# Patient Record
Sex: FEMALE | Race: AMERICAN INDIAN OR ALASKA NATIVE | NOT HISPANIC OR LATINO | Employment: UNEMPLOYED | URBAN - NONMETROPOLITAN AREA
[De-identification: names, ages, dates, MRNs, and addresses within clinical notes are randomized per-mention and may not be internally consistent; named-entity substitution may affect disease eponyms.]

---

## 2017-01-17 ENCOUNTER — AMBULATORY - GICH (OUTPATIENT)
Dept: PHYSICAL THERAPY | Facility: OTHER | Age: 52
End: 2017-01-17

## 2017-01-17 DIAGNOSIS — M76.52 PATELLAR TENDINITIS OF LEFT KNEE: ICD-10-CM

## 2017-01-17 DIAGNOSIS — M17.0 PRIMARY OSTEOARTHRITIS OF BOTH KNEES: ICD-10-CM

## 2017-01-17 DIAGNOSIS — M76.51 PATELLAR TENDINITIS OF RIGHT KNEE: ICD-10-CM

## 2017-01-17 DIAGNOSIS — M23.209: ICD-10-CM

## 2017-01-20 ENCOUNTER — HOSPITAL ENCOUNTER (OUTPATIENT)
Dept: PHYSICAL THERAPY | Facility: OTHER | Age: 52
Setting detail: THERAPIES SERIES
End: 2017-01-20

## 2017-01-20 DIAGNOSIS — M23.209: ICD-10-CM

## 2017-01-20 DIAGNOSIS — M76.52 PATELLAR TENDINITIS OF LEFT KNEE: ICD-10-CM

## 2017-01-20 DIAGNOSIS — M76.51 PATELLAR TENDINITIS OF RIGHT KNEE: ICD-10-CM

## 2017-01-20 DIAGNOSIS — M17.0 PRIMARY OSTEOARTHRITIS OF BOTH KNEES: ICD-10-CM

## 2017-01-26 ENCOUNTER — HOSPITAL ENCOUNTER (OUTPATIENT)
Dept: PHYSICAL THERAPY | Facility: OTHER | Age: 52
Setting detail: THERAPIES SERIES
End: 2017-01-26

## 2017-02-02 ENCOUNTER — HOSPITAL ENCOUNTER (OUTPATIENT)
Dept: PHYSICAL THERAPY | Facility: OTHER | Age: 52
Setting detail: THERAPIES SERIES
End: 2017-02-02

## 2017-02-09 ENCOUNTER — HOSPITAL ENCOUNTER (OUTPATIENT)
Dept: PHYSICAL THERAPY | Facility: OTHER | Age: 52
Setting detail: THERAPIES SERIES
End: 2017-02-09

## 2017-02-14 ENCOUNTER — HOSPITAL ENCOUNTER (OUTPATIENT)
Dept: PHYSICAL THERAPY | Facility: OTHER | Age: 52
Setting detail: THERAPIES SERIES
End: 2017-02-14

## 2017-02-28 ENCOUNTER — HOSPITAL ENCOUNTER (OUTPATIENT)
Dept: PHYSICAL THERAPY | Facility: OTHER | Age: 52
Setting detail: THERAPIES SERIES
End: 2017-02-28

## 2017-03-02 ENCOUNTER — HOSPITAL ENCOUNTER (OUTPATIENT)
Dept: PHYSICAL THERAPY | Facility: OTHER | Age: 52
Setting detail: THERAPIES SERIES
End: 2017-03-02

## 2017-03-14 ENCOUNTER — HOSPITAL ENCOUNTER (OUTPATIENT)
Dept: PHYSICAL THERAPY | Facility: OTHER | Age: 52
Setting detail: THERAPIES SERIES
End: 2017-03-14

## 2017-09-01 ENCOUNTER — AMBULATORY - GICH (OUTPATIENT)
Dept: RADIOLOGY | Facility: OTHER | Age: 52
End: 2017-09-01

## 2017-09-01 DIAGNOSIS — M19.90 OSTEOARTHRITIS: ICD-10-CM

## 2017-09-07 ENCOUNTER — HOSPITAL ENCOUNTER (OUTPATIENT)
Dept: RADIOLOGY | Facility: OTHER | Age: 52
End: 2017-09-07

## 2017-09-07 DIAGNOSIS — M19.90 OSTEOARTHRITIS: ICD-10-CM

## 2017-09-11 ENCOUNTER — AMBULATORY - GICH (OUTPATIENT)
Dept: PHYSICAL THERAPY | Facility: OTHER | Age: 52
End: 2017-09-11

## 2017-09-11 DIAGNOSIS — M17.0 PRIMARY OSTEOARTHRITIS OF BOTH KNEES: ICD-10-CM

## 2017-09-12 ENCOUNTER — AMBULATORY - GICH (OUTPATIENT)
Dept: PHYSICAL THERAPY | Facility: OTHER | Age: 52
End: 2017-09-12

## 2017-09-12 DIAGNOSIS — M25.562 PAIN IN LEFT KNEE: ICD-10-CM

## 2017-09-12 DIAGNOSIS — M25.561 PAIN IN RIGHT KNEE: ICD-10-CM

## 2017-09-27 ENCOUNTER — HOSPITAL ENCOUNTER (OUTPATIENT)
Dept: PHYSICAL THERAPY | Facility: OTHER | Age: 52
Setting detail: THERAPIES SERIES
End: 2017-09-27

## 2017-09-27 DIAGNOSIS — M25.561 PAIN IN RIGHT KNEE: ICD-10-CM

## 2017-09-27 DIAGNOSIS — M25.562 PAIN IN LEFT KNEE: ICD-10-CM

## 2017-10-12 ENCOUNTER — HOSPITAL ENCOUNTER (OUTPATIENT)
Dept: PHYSICAL THERAPY | Facility: OTHER | Age: 52
Setting detail: THERAPIES SERIES
End: 2017-10-12

## 2017-10-19 ENCOUNTER — HOSPITAL ENCOUNTER (OUTPATIENT)
Dept: PHYSICAL THERAPY | Facility: OTHER | Age: 52
Setting detail: THERAPIES SERIES
End: 2017-10-19

## 2017-12-27 NOTE — PROGRESS NOTES
Patient Information     Patient Name MRN Danii Adams [id number] Female 1965      Progress Notes by Ruth Cano PT at 10/9/2017  2:36 PM     Author:  Ruth Cano, PT Service:  (none) Author Type:  PT- Physical Therapist     Filed:  10/9/2017  2:37 PM Date of Service:  10/9/2017  2:36 PM Status:  Signed     :  Ruth Cano PT (PT- Physical Therapist)            [facility]  Outpatient PT - Cancellation Note    Date:  10-9-17      Patient cancelled 10-9-17 visit due to migraine.  Had no-showed 10-4-17 visit.      Next visit scheduled:  10-12-17      Ruth Cano, PT ....................  10/9/2017   2:37 PM

## 2017-12-28 NOTE — PROGRESS NOTES
Patient Information     Patient Name MRN Sex Danii Patel [id number] Female 1965      Progress Notes by Ruth Cano PT at 10/19/2017  2:41 PM     Author:  Ruth Cano PT Service:  (none) Author Type:  PT- Physical Therapist     Filed:  10/19/2017  2:45 PM Date of Service:  10/19/2017  2:41 PM Status:  Signed     :  Ruth Cano PT (PT- Physical Therapist)            [facility]  Outpatient PT - Daily Note    Date of Service: 10/19/2017   Visit #3  Insurance Auth: 20    PSFS Visit:  3/10  PSFS Completed:  17    Patient Name: Danii Henderson   YOB: 1965   Referring MD/Provider: Dr. Strauss  Medical and Treatment Diagnosis: Bilateral knee pain [M25.561, M25.562]   PT Treatment Diagnosis: decreased mobility  Insurance: [facility]  Start of Service: 17  Certification Dates: Start of Service: 17  Medicare/MA Re-Cert Due: 17             Subjective      Rates pain: 7/10 in low back and both knees, right greater than left.  Ongoing over the past 3 days.  Using cream on back and knees seems to help.  No aggravating activity or injury that she can think of.     is scheduled to get stem cells injected into right.  botox injections are supposed to be the week of  or the week after.  Still waiting for pre-authorization.        Objective    Today's Intervention:      Observation:  Wearing hinged knee brace and using 4WW.    All BLE:  - quad sets, 5 sec holds, 10 x 2  - heel digs, 5 sec holds, 10 x 2  - SLR, 10 x 2    - sidelying ABD, 10 x 2  - prone ext, 10 x 2  - sidelyng ADD, 10 x 2    - Standing heel raises, 15 x 2  - gastroc stretch, 20 sec x 2      Next visit:  Progression of strengthening       Home Exercise Program:    None today.          Assessment    Therapist Assessment / Clinical Impression:  Rated pain 7/10 after exercise, and no indication of increased discomfort while doing  exercises today.        Goals:  Patient goal:  Decrease pain in 8 weeks.    Functional Short Term Goals (4 weeks):   - Patient will demonstrate correct performance of flexibility exercises for decreased knee pain.      Functional Long Term Goals (8 weeks):   - Patient will demonstrate 4/5 or better LE strength for increased stability with standing and walking.  - Patient will be able to walk up/down 4 steps with no pain/limitation.      Completed 9-27-17:  Patient Specific Functional and Pain Scales (PSFS)  Clinician Instructions: Complete after the history and before the exam.   Initial Assessment:   We want to know what 3 activities in your life you are unable to perform, or are having the most difficulty performing, as a result of your chief problem. Please list and score at least 3 activities that you are unable to perform, or having the most difficulty performing, because of your chief problem.   Patient Specific Activity Scoring Scheme (score one number for each activity):   Activity Score (0-10)  0= Unable to perform activity  10= Able to perform activity at same level as before injury or problem   1. walking  3/10   2. stairs 5/10   3. sleeping 3/10   Totals:  3.6/10   Patient verbally states that they understand that the information they have provided above is current and complete to the best of their knowledge.   Patient Specific Functional Scale Modifier Scale Conversion: (patient's modifier that correlates with pt's score on PSFS): 3-CL (70% Impaired).  Initial Primary G Code and Modifier:    Per the Patient's intake and/or assessment the Primary G Code is: Mobility .   The Patient's Impairment, Limitation or Restriction Modifier would be best described as: CL - 60% - 80% Impairment.   Goal Primary G Code and Modifier:    The Patient's G Code Goal would be: Mobility    The Patient's Impairment, Limitation or Restriction Modifier goal would be best described as: CJ - 20% - 40% Impairment.        Response to Intervention:  Good tolerance to treatment         Plan    Treatment Plan:      Frequency:   16 visits    Duration of Treatment: 8 weeks    Planned Interventions:    Home Exercise Program development  Therapeutic Exercise (ROM & Strengthening)  Therapeutic Activities  Neuromuscular Re-education  Manual Therapy  Ultrasound  E-Stim  Gait Training  Hot Packs / Cold Pack Modalities  Vasopneumatic Compression with Cold Therapy ( Game Ready )  Phonophoresis with Ketoprofen  Iontophoresis with Dexamethasone  Mechanical Traction    Plan for next visit:  See above

## 2017-12-28 NOTE — PROGRESS NOTES
Patient Information     Patient Name MRN Sex Danii Patel [id number] Female 1965      Progress Notes by Ruth Cano PT at 10/12/2017  3:33 PM     Author:  Ruth Cano PT Service:  (none) Author Type:  PT- Physical Therapist     Filed:  10/12/2017  4:10 PM Date of Service:  10/12/2017  3:33 PM Status:  Signed     :  Ruth Cano PT (PT- Physical Therapist)            [facility]  Outpatient PT - Daily Note    Date of Service: 10/12/2017   Visit #2  Insurance Auth: 20    PSFS Visit:  2/10  PSFS Completed:  17    Patient Name: Danii Henderson   YOB: 1965   Referring MD/Provider: Dr. Strauss  Medical and Treatment Diagnosis: Bilateral knee pain [M25.561, M25.562]   PT Treatment Diagnosis: decreased mobility  Insurance: [facility]  Start of Service: 17  Certification Dates: Start of Service: 17  Medicare/MA Re-Cert Due: 17             Subjective      Patient has not been seen since 17 (2 weeks).  Patient states she has been struggling with ongoing migraine headaches.    Rates pain: 4/10.  Did ice pack and applied cream to knee prior to therapy today.     is scheduled to get stem cells injected into right.  botox injections are supposed to be the week of  or the week after.  Still waiting for pre-authorization.        Objective    Today's Intervention:      Observation:  Wearing hinged knee brace and using 4WW.    - Nustep: seat 8, arms 9, workload 4 for 5 minutes.     - quad sets, 5 sec holds, 10 x 1  - heel digs, 5 sec holds, 10 x 1  - SLR, 10 x 1  - heel slides, 10 x 1  - SAQ, 2# 10 x 2    - sidelying ABD, 10 x 2  - prone ext, 10 x 2  - sidelyng ADD, 10 x 2    MedX:  Leg Press (seat 17, F): 60# 15 x 2 RLE    - Patient education for positioning and securing hinged knee brace.      Next visit:  Progression of strengthening       Home Exercise Program:    None  today.          Assessment    Therapist Assessment / Clinical Impression:  Did well with strengthening exercises.  No complaint of discomfort.        Goals:  Patient goal:  Decrease pain in 8 weeks.    Functional Short Term Goals (4 weeks):   - Patient will demonstrate correct performance of flexibility exercises for decreased knee pain.      Functional Long Term Goals (8 weeks):   - Patient will demonstrate 4/5 or better LE strength for increased stability with standing and walking.  - Patient will be able to walk up/down 4 steps with no pain/limitation.      Completed 9-27-17:  Patient Specific Functional and Pain Scales (PSFS)  Clinician Instructions: Complete after the history and before the exam.   Initial Assessment:   We want to know what 3 activities in your life you are unable to perform, or are having the most difficulty performing, as a result of your chief problem. Please list and score at least 3 activities that you are unable to perform, or having the most difficulty performing, because of your chief problem.   Patient Specific Activity Scoring Scheme (score one number for each activity):   Activity Score (0-10)  0= Unable to perform activity  10= Able to perform activity at same level as before injury or problem   1. walking  3/10   2. stairs 5/10   3. sleeping 3/10   Totals:  3.6/10   Patient verbally states that they understand that the information they have provided above is current and complete to the best of their knowledge.   Patient Specific Functional Scale Modifier Scale Conversion: (patient's modifier that correlates with pt's score on PSFS): 3-CL (70% Impaired).  Initial Primary G Code and Modifier:    Per the Patient's intake and/or assessment the Primary G Code is: Mobility .   The Patient's Impairment, Limitation or Restriction Modifier would be best described as: CL - 60% - 80% Impairment.   Goal Primary G Code and Modifier:    The Patient's G Code Goal would be: Mobility     The Patient's Impairment, Limitation or Restriction Modifier goal would be best described as: CJ - 20% - 40% Impairment.       Response to Intervention:  Good tolerance to treatment         Plan    Treatment Plan:      Frequency:   16 visits    Duration of Treatment: 8 weeks    Planned Interventions:    Home Exercise Program development  Therapeutic Exercise (ROM & Strengthening)  Therapeutic Activities  Neuromuscular Re-education  Manual Therapy  Ultrasound  E-Stim  Gait Training  Hot Packs / Cold Pack Modalities  Vasopneumatic Compression with Cold Therapy ( Game Ready )  Phonophoresis with Ketoprofen  Iontophoresis with Dexamethasone  Mechanical Traction    Plan for next visit:  See above

## 2017-12-28 NOTE — PROGRESS NOTES
Patient Information     Patient Name MRN Danii Adams [id number] Female 1965      Progress Notes by Naomi Bueno at 2017 11:44 AM     Author:  Naomi Bueno Service:  (none) Author Type:  Other Clinical Staff     Filed:  2017 11:44 AM Date of Service:  2017 11:44 AM Status:  Signed     :  Naomi Bueno (Other Clinical Staff)            Falls Risk Criteria:    Age 65 and older or under age 4        Sensory deficits    Poor vision    Use of ambulatory aides    Impaired judgment    Unable to walk independently    Meets High Risk criteria for falls:  no

## 2017-12-28 NOTE — ADDENDUM NOTE
Patient Information     Patient Name MRN Danii Adams [id number] Female 1965      Addendum Note by Nino Leal at 2017  7:52 AM     Author:  Nino Lela Service:  (none) Author Type:  (none)     Filed:  2017  7:52 AM Encounter Date:  2017 Status:  Signed     :  Nino Leal       Addended by: NINO LEAL on: 2017 07:52 AM        Modules accepted: Orders

## 2017-12-30 NOTE — INITIAL ASSESSMENTS
Patient Information     Patient Name MRN Danii Adams [id number] Female 1965      Initial Assessments by Ruth Cano PT at 2017 10:25 AM     Author:  Ruth Cano PT  Service:  (none) Author Type:  PT- Physical Therapist     Filed:  2017  8:59 AM  Date of Service:  2017 10:25 AM Status:  Addendum     :  Ruth Cano PT (PT- Physical Therapist)        Related Notes: Original Note by Ruth Cano PT (PT- Physical Therapist) filed at 2017  8:57 AM            [facility]  Outpatient PT - Initial Evaluation  Lower Extremity Eval    Date of Service: 2017   Visit #1  Insurance Auth: 20    PSFS Visit:  1/10  PSFS Completed:  17    Patient Name: Danii Henderson   YOB: 1965   Referring MD/Provider: Dr. Strauss  Medical and Treatment Diagnosis: Bilateral knee pain [M25.561, M25.562]   PT Treatment Diagnosis: decreased mobility  Insurance: [facility]  Start of Service: 17  Certification Dates: Start of Service: 17  Medicare/MA Re-Cert Due: 17     Precautions:  None   Cognition:  Oriented to Person, Place, and Time.     Were cultural / age or other special adaptations needed? No      Patient is a vulnerable adult: No      Patient is aware of diagnosis: Yes      Risks and benefits explained: Yes        Subjective        Date of onset: Several years ago, seems to be getting worse.  MRI was done for both knees.  Patietn describes a cyst behind the right knee and osteoarthritis has gotten worse.  Had tried a knee brace in the past, but that didn't seem to be helping enough, so now is using a 4WW.  Feels this is working well.  Wakes with pain during the night.    Follow up with physician:  October  Scheduled to see interventional pain clinic appointment for .    Supposed to get stem cells in right knee and botox injection (for migraine HA) on , but this was  "cancelled due to insurance error.     Rates pain: 8-10/10  Describes pain: sharp   Locates pain: under knee cap \"inside\"  Aggravating: walking, stairs  Easing: uses penetex (anti-inflammatory cream), zenadine, oxycontin (30 mg 3x/d), oxycodone (15 mg 4x/d)          Completed 9-27-17:  Patient Specific Functional and Pain Scales (PSFS)  Clinician Instructions: Complete after the history and before the exam.   Initial Assessment:   We want to know what 3 activities in your life you are unable to perform, or are having the most difficulty performing, as a result of your chief problem. Please list and score at least 3 activities that you are unable to perform, or having the most difficulty performing, because of your chief problem.   Patient Specific Activity Scoring Scheme (score one number for each activity):   Activity Score (0-10)  0= Unable to perform activity  10= Able to perform activity at same level as before injury or problem   1. walking  3/10   2. stairs 5/10   3. sleeping 3/10   Totals:  3.6/10   Patient verbally states that they understand that the information they have provided above is current and complete to the best of their knowledge.   Patient Specific Functional Scale Modifier Scale Conversion: (patient's modifier that correlates with pt's score on PSFS): 3-CL (70% Impaired).  Initial Primary G Code and Modifier:    Per the Patient's intake and/or assessment the Primary G Code is: Mobility .   The Patient's Impairment, Limitation or Restriction Modifier would be best described as: CL - 60% - 80% Impairment.   Goal Primary G Code and Modifier:    The Patient's G Code Goal would be: Mobility    The Patient's Impairment, Limitation or Restriction Modifier goal would be best described as: CJ - 20% - 40% Impairment.       Prior Level:  Minimal to no difficulty completing functional activities.        Past Medical History:     Diagnosis  Date     Unspecified Migraine without Mention of " Intractable Migraine      Past Surgical History:      Procedure  Laterality Date     HX ANKLE FRACTURE TREATMENT      left       HX TUBAL LIGATION            Driving:  yes  Home Environment:  Patient lives in a house with 4 steps to enter and 0 flights up/down stairs.  Assistive Devices: 4WW    Occupation:  Not working    Previous Treatment:    Pain Meds / Anti-inflammatory Meds  - Yes   Physical Therapy - yes  Injections - for knees, over the past 7 years  Surgery - No  Pain Clinic -  [facility]    Diagnostics:  Reviewed (see chart)   Current Medications:  Reviewed (see chart)    Drug Allergies:  Reviewed (see chart)  ?   Latex Allergy:  No          Objective    Items left blank indicate that the test was inappropriate or not meaningful at the time of evaluation and therefore not performed.    Fall Risk Screening:  No risk factors identified      ROM / Strength:                 Norms Left  Right Strength Left  Right   Hip Flexion 120  103 90 Hip Flexion     Hip Extension 15   Hip Extension 3/5 3/5   Hip Abduction 50   Hip Abduction 4/5 3/5   Hip External Rot. 60 60 60 Hip External Rot.     Hip Internal Rot. 40 32 25 Hip Internal Rot.     Knee Extension 0 +3 0 Knee Extension 5/5 5/5   Knee Flexion 135 135 121 Knee Flexion 5/5 5/5   Dorsiflexion 20   Dorsiflexion 5/5 5/5   Plantarflexion    Plantarflexion         Knee Circumference:   L R   5 cm prox to sup pat pole 41 42.5   Joint line 35 35   10 cm distal to inf pat pole 37 36.5       Observation:    SLR: L = 51 R = 55  Limited quadriceps flexibility bilaterally    Palpation:  Decreased lateral patellar mobility right greater than left.  No pain along joint line or posterior knee in sitting.    Gait:  Ambulation with 4WW.  Forward flexed posture.  Equal step length bilaterally.  Functional pace.          Today's Intervention:    - Evaluation  - Patient education for results of evaluation, goals, and treatment plan.  Patient voices  understanding and agreement.  - Adjusted height of walker handles to improve standing posture while walking.    Home Exercise Program:    None today.          Assessment    Therapist Assessment / Clinical Impression:  Signs and symptoms consistent with above diagnosis of bilateral knee pain.  Patient demonstrates decreased quadriceps flexibility, decreased lateral patellar mobility, and general LE weakness.      Functional Impairment(s): See subjective on initial evaluation and Functional Assessment / Summary Report from PSFS.        Goals:  Patient goal:  Decrease pain in 8 weeks.    Functional Short Term Goals (4 weeks):   - Patient will demonstrate correct performance of flexibility exercises for decreased knee pain.      Functional Long Term Goals (8 weeks):   - Patient will demonstrate 4/5 or better LE strength for increased stability with standing and walking.  - Patient will be able to walk up/down 4 steps with no pain/limitation.        Patient participated in goal selection and understand(s) the plan of care: Yes   Patient Potential for Achieving Desired Outcome: Good       Response to Intervention:  Good tolerance to treatment         Plan    Treatment Plan:      Frequency:   16 visits    Duration of Treatment: 8 weeks    Planned Interventions:    Home Exercise Program development  Therapeutic Exercise (ROM & Strengthening)  Therapeutic Activities  Neuromuscular Re-education  Manual Therapy  Ultrasound  E-Stim  Gait Training  Hot Packs / Cold Pack Modalities  Vasopneumatic Compression with Cold Therapy ( Game Ready )  Phonophoresis with Ketoprofen  Iontophoresis with Dexamethasone  Mechanical Traction    Plan for next visit:  See above    Thank you for your referral to [facility].  Please call with any questions, concerns or comments.  [phone]    The signature, of the referring medical provider, on this document indicates certification of the above prescribed plan of care and is  medically necessary.            X____________________________________________________    Physician Signature                     Date  Time

## 2018-01-03 NOTE — PROGRESS NOTES
Patient Information     Patient Name MRN Danii Adams [id number] Female 1965      Progress Notes by Ruth Cano, PT at 3/14/2017  4:14 PM     Author:  Ruth Cano, PT Service:  (none) Author Type:  PT- Physical Therapist     Filed:  3/14/2017  4:15 PM Date of Service:  3/14/2017  4:14 PM Status:  Signed     :  Ruth Cano PT (PT- Physical Therapist)            [facility]  Outpatient PT Note    Date:  3-14-17    Patient did not show for appointment today.  Had cancelled last week due to a death in the family.    Next scheduled visit:  3-16-17, 3-21-17        Ruth Cano, PT ....................  3/14/2017   4:15 PM

## 2018-01-03 NOTE — PROGRESS NOTES
Patient Information     Patient Name MRN Sex Danii Patel [id number] Female 1965      Progress Notes by Ruth Cano, PT at 3/2/2017  1:02 PM     Author:  Ruth Cano, PT Service:  (none) Author Type:  PT- Physical Therapist     Filed:  3/2/2017  1:42 PM Date of Service:  3/2/2017  1:02 PM Status:  Signed     :  Ruth Cano PT (PT- Physical Therapist)            [facility]  Outpatient PT - Daily Note    Date of Service: 3/2/2017   Visit #7  Insurance Auth:     PSFS Visit:  7/10  PSFS Completed:  17    Patient Name: Danii Hendersno   YOB: 1965   Referring MD/Provider: Dr. Strauss  Medical and Treatment Diagnosis: Bilateral knee osteoarthritis, patellar tenonitis, meniscus tear  PT Treatment Diagnosis: decreased mobility  Insurance: [employer]  Start of Service: 17  Certification Dates: Start of Service: 17  Medicare/MA Re-Cert Due: 17           Subjective      Rates pain:  3/10 in low back and knees.      Follow up with physician:  2017      Objective    Today's Intervention:      - standing quadriceps stretches with foot on chair seat, 20 sec x 3 BLE    - quad sets, 5 sec holds, 5 x 1 BLE  - SLR 10 x  BLE  - Sidelying ABD, 1# 10 x 2 BLE  - bridge, 10 x 1  - bridge, increased WB R = 5 x 1; L = 10 x 1; 10 x 1     MedX:  Leg Press (seat 20):  100# 15 x 1; 110# 15 x 1  Hamstring curls (back 7): 70# 15 x 1; 8 x 1  Abduction (knees 1):  60# 15 x 2  Leg Extension (back 7): 40# 15 x 2    - standing mini-lunge with hand on rail, 10 x 1 BLE - cues for a longer step which improved technique significantly    Next Visit:  hip and knee strengthening      Home Exercise Program:    17:  Quad, hamstring, gastroc stretches  17:  Bridge, supine hip IR/ER  17:  Quad set, heel dig, SLR          Assessment    Therapist Assessment / Clinical Impression:  Continued fatigue with hamstring curls.  Able to  increase reps for all other machines.        Goals:  Patient goal:  Decreased knee pain with walking in 8 weeks.    Functional Short Term Goals (4 weeks):   - Patient will demonstrate correct performance of HEP with minimal to no cues required for increased BLE flexibility.      Functional Long Term Goals (8 weeks):   - Patient will report decreased pain from 8/10 to 0-4/10 with walking.  - Patient will demonstrate 4+/5 hip strength testing or better for improved stability with prolonged standing and walking.  - Patient will report minimal to no sleep disruption due to knee pain.        Completed 1-20-17:  Patient Specific Functional and Pain Scales (PSFS)  Clinician Instructions: Complete after the history and before the exam.   Initial Assessment:   We want to know what 3 activities in your life you are unable to perform, or are having the most difficulty performing, as a result of your chief problem. Please list and score at least 3 activities that you are unable to perform, or having the most difficulty performing, because of your chief problem.   Patient Specific Activity Scoring Scheme (score one number for each activity):   Activity Score (0-10)  0= Unable to perform activity  10= Able to perform activity at same level as before injury or problem   1. walking 4/10   2. sleeping 6/10   3. Household tasks 6/10   Totals:  5.3/10   Patient verbally states that they understand that the information they have provided above is current and complete to the best of their knowledge.   Patient Specific Functional Scale Modifier Scale Conversion: (patient's modifier that correlates with pt's score on PSFS): 5-CK (50% Impaired).  Initial Primary G Code and Modifier:    Per the Patient's intake and/or assessment the Primary G Code is: Mobility .   The Patient's Impairment, Limitation or Restriction Modifier would be best described as: CK - 40% - 60% Impairment.   Goal Primary G Code and Modifier:    The Patient's G  Code Goal would be: Mobility    The Patient's Impairment, Limitation or Restriction Modifier goal would be best described as: CI - 1% - 20% Impairment.            Response to Intervention:  Good tolerance to treatment         Plan    Treatment Plan:      Frequency:   16 visits    Duration of Treatment: 8 weeks    Planned Interventions:    Home Exercise Program development  Therapeutic Exercise (ROM & Strengthening)  Therapeutic Activities  Neuromuscular Re-education  Manual Therapy  Ultrasound  E-Stim  Gait Training  Hot Packs / Cold Pack Modalities  Vasopneumatic Compression with Cold Therapy ( Game Ready )  Phonophoresis with Ketoprofen  Iontophoresis with Dexamethasone  Mechanical Traction    Plan for next visit:  See above.

## 2018-01-03 NOTE — PROGRESS NOTES
Patient Information     Patient Name MRN Sex Danii Patel [id number] Female 1965      Progress Notes by Ruth Cano PT at 2017  1:43 PM     Author:  Ruth Cano PT Service:  (none) Author Type:  PT- Physical Therapist     Filed:  2017  4:39 PM Date of Service:  2017  1:43 PM Status:  Signed     :  Ruth Cano PT (PT- Physical Therapist)            [facility]  Outpatient PT - Daily Note    Date of Service: 2017   Visit #5  Insurance Auth:     PSFS Visit:  5/10  PSFS Completed:  17    Patient Name: Danii Henderson   YOB: 1965   Referring MD/Provider: Dr. Strauss  Medical and Treatment Diagnosis: Bilateral knee osteoarthritis, patellar tenonitis, meniscus tear  PT Treatment Diagnosis: decreased mobility  Insurance: [facility]  Start of Service: 17  Certification Dates: Start of Service: 17  Medicare/MA Re-Cert Due: 17           Subjective      Rates pain: 4-5/10.  Was getting some sharp pains on the way over to appointment today so used some of her cream.  Feels treatment is helping and would like to try stim on both knees.          Follow up with physician:  2017      Objective    Today's Intervention:      - Electrical stim with electrodes placed at VMO and lateral gastroc bilaterally.  After between 5-10 min, patient had moved the 2nd channel of electrodes back to the right knee in crossed position from VLO and medial proximal gastroc, 20 min plus set-up time.  Patient supine with large pillow under bilateral knees.  - Hot Packs to anterior knees and distal quads bilaterally, 20 min - simultaneous with E-stim    - prone quadriceps stretches, 20 sec x 4 BLE  - quad sets, 5 sec holds, 5 x 1  - SLR 5 x 1 BLE  - bridge, 10 x 1  - Sidelying ABD, 10 x 1 BLE    - Recommended continuation of heat with e-stim with home unit.  Plan to progress exercises as tolerated.      Next  Visit:  Modalities prn for pain control, hip strengthening (add ABD), add knee flexion if tolerated      Home Exercise Program:    1-20-17:  Quad, hamstring, gastroc stretches  1-26-17:  Bridge, supine hip IR/ER  2-2-17:  Quad set, heel dig, SLR          Assessment    Therapist Assessment / Clinical Impression:  Continued decreased pain with modalities.  Able to complete exercises today.        Goals:  Patient goal:  Decreased knee pain with walking in 8 weeks.    Functional Short Term Goals (4 weeks):   - Patient will demonstrate correct performance of HEP with minimal to no cues required for increased BLE flexibility.      Functional Long Term Goals (8 weeks):   - Patient will report decreased pain from 8/10 to 0-4/10 with walking.  - Patient will demonstrate 4+/5 hip strength testing or better for improved stability with prolonged standing and walking.  - Patient will report minimal to no sleep disruption due to knee pain.        Completed 1-20-17:  Patient Specific Functional and Pain Scales (PSFS)  Clinician Instructions: Complete after the history and before the exam.   Initial Assessment:   We want to know what 3 activities in your life you are unable to perform, or are having the most difficulty performing, as a result of your chief problem. Please list and score at least 3 activities that you are unable to perform, or having the most difficulty performing, because of your chief problem.   Patient Specific Activity Scoring Scheme (score one number for each activity):   Activity Score (0-10)  0= Unable to perform activity  10= Able to perform activity at same level as before injury or problem   1. walking 4/10   2. sleeping 6/10   3. Household tasks 6/10   Totals:  5.3/10   Patient verbally states that they understand that the information they have provided above is current and complete to the best of their knowledge.   Patient Specific Functional Scale Modifier Scale Conversion: (patient's modifier that  correlates with pt's score on PSFS): 5-CK (50% Impaired).  Initial Primary G Code and Modifier:    Per the Patient's intake and/or assessment the Primary G Code is: Mobility .   The Patient's Impairment, Limitation or Restriction Modifier would be best described as: CK - 40% - 60% Impairment.   Goal Primary G Code and Modifier:    The Patient's G Code Goal would be: Mobility    The Patient's Impairment, Limitation or Restriction Modifier goal would be best described as: CI - 1% - 20% Impairment.            Response to Intervention:  Good tolerance to treatment         Plan    Treatment Plan:      Frequency:   16 visits    Duration of Treatment: 8 weeks    Planned Interventions:    Home Exercise Program development  Therapeutic Exercise (ROM & Strengthening)  Therapeutic Activities  Neuromuscular Re-education  Manual Therapy  Ultrasound  E-Stim  Gait Training  Hot Packs / Cold Pack Modalities  Vasopneumatic Compression with Cold Therapy ( Game Ready )  Phonophoresis with Ketoprofen  Iontophoresis with Dexamethasone  Mechanical Traction    Plan for next visit:  See above.

## 2018-01-03 NOTE — PROGRESS NOTES
"Patient Information     Patient Name MRN Danii Adams [id number] Female 1965      Progress Notes by Ruth Cano PT at 2017 12:59 PM     Author:  Ruth Cano PT  Service:  (none) Author Type:  PT- Physical Therapist     Filed:  3/2/2017  1:45 PM  Date of Service:  2017 12:59 PM Status:  Addendum     :  Ruth Cano PT (PT- Physical Therapist)        Related Notes: Original Note by Ruth Cano PT (PT- Physical Therapist) filed at 2017  3:26 PM            [facility]  Outpatient PT - Daily Note    Date of Service: 2017   Visit #6  Insurance Auth:     PSFS Visit:  6/10  PSFS Completed:  17    Patient Name: Danii Henderson   YOB: 1965   Referring MD/Provider: Dr. Strauss  Medical and Treatment Diagnosis: Bilateral knee osteoarthritis, patellar tenonitis, meniscus tear  PT Treatment Diagnosis: decreased mobility  Insurance: [employer]  Start of Service: 17  Certification Dates: Start of Service: 17  Medicare/MA Re-Cert Due: 17           Subjective      Patient has not been seen since 17 (2 weeks).    Rates pain:  2/10 in back and knees.  Fell a couple of Saturdays ago.  Slipped on ice landing directly on both knees and torres her back.  States she was bed-ridden for about 4 days.  Did use ice for back and knees.  Was seen by a pain physician in the [facility] and was told she has some trigger points in her low back.  Still using penetrix pain cream.        Follow up with physician:  2017      Objective    Today's Intervention:      - standing quadriceps stretches with foot on chair seat, 20 sec x 3 LLE  - standing quadriceps stretches with foot on 8\", 12\" step, then took brace off and switched to chair seat, 20 sec x 3 RLE    - quad sets, 5 sec holds, 10 x 1 BLE  - SLR 10 x  BLE  - Sidelying ABD, 10 x 2 BLE  - bridge, 10 x 2    MedX:  Leg Press (seat 20):  100# " 10 x 2  Hamstring curls (back 7): 70# 10 x 2  Abduction (knees 1):  60# 10 x 2  Leg Extension (back 7): 40# 10 x 2    - standing mini-lunge with hand on rail, 10 x 2 BLE - demonstration and cues required    Next Visit:  Modalities prn for pain control, hip strengthening (add ABD), add knee flexion if tolerated      Home Exercise Program:    1-20-17:  Quad, hamstring, gastroc stretches  1-26-17:  Bridge, supine hip IR/ER  2-2-17:  Quad set, heel dig, SLR          Assessment    Therapist Assessment / Clinical Impression:  Patient was able to complete all exercises.  Hamstring curl was the only machine that patient reported some knee pain.  Suggested patient stop if her pain was increasing, but patient wished to complete reps/sets.        Goals:  Patient goal:  Decreased knee pain with walking in 8 weeks.    Functional Short Term Goals (4 weeks):   - Patient will demonstrate correct performance of HEP with minimal to no cues required for increased BLE flexibility.      Functional Long Term Goals (8 weeks):   - Patient will report decreased pain from 8/10 to 0-4/10 with walking.  - Patient will demonstrate 4+/5 hip strength testing or better for improved stability with prolonged standing and walking.  - Patient will report minimal to no sleep disruption due to knee pain.        Completed 1-20-17:  Patient Specific Functional and Pain Scales (PSFS)  Clinician Instructions: Complete after the history and before the exam.   Initial Assessment:   We want to know what 3 activities in your life you are unable to perform, or are having the most difficulty performing, as a result of your chief problem. Please list and score at least 3 activities that you are unable to perform, or having the most difficulty performing, because of your chief problem.   Patient Specific Activity Scoring Scheme (score one number for each activity):   Activity Score (0-10)  0= Unable to perform activity  10= Able to perform activity at same level as  before injury or problem   1. walking 4/10   2. sleeping 6/10   3. Household tasks 6/10   Totals:  5.3/10   Patient verbally states that they understand that the information they have provided above is current and complete to the best of their knowledge.   Patient Specific Functional Scale Modifier Scale Conversion: (patient's modifier that correlates with pt's score on PSFS): 5-CK (50% Impaired).  Initial Primary G Code and Modifier:    Per the Patient's intake and/or assessment the Primary G Code is: Mobility .   The Patient's Impairment, Limitation or Restriction Modifier would be best described as: CK - 40% - 60% Impairment.   Goal Primary G Code and Modifier:    The Patient's G Code Goal would be: Mobility    The Patient's Impairment, Limitation or Restriction Modifier goal would be best described as: CI - 1% - 20% Impairment.            Response to Intervention:  Good tolerance to treatment         Plan    Treatment Plan:      Frequency:   16 visits    Duration of Treatment: 8 weeks    Planned Interventions:    Home Exercise Program development  Therapeutic Exercise (ROM & Strengthening)  Therapeutic Activities  Neuromuscular Re-education  Manual Therapy  Ultrasound  E-Stim  Gait Training  Hot Packs / Cold Pack Modalities  Vasopneumatic Compression with Cold Therapy ( Game Ready )  Phonophoresis with Ketoprofen  Iontophoresis with Dexamethasone  Mechanical Traction    Plan for next visit:  See above.

## 2018-01-03 NOTE — PROGRESS NOTES
Patient Information     Patient Name MRN Danii Adams [id number] Female 1965      Progress Notes by Ruth Cano, PT at 2017  9:37 AM     Author:  Ruth Cano, PT Service:  (none) Author Type:  PT- Physical Therapist     Filed:  2017  9:37 AM Date of Service:  2017  9:37 AM Status:  Signed     :  Ruth Cano PT (PT- Physical Therapist)            [facility]  Outpatient PT - Cancellation Note    Date:  17      Patient cancelled 17 visit due to illness.      Next visit scheduled:  17      Ruth Cano PT ....................  2017   9:37 AM

## 2018-01-03 NOTE — PROGRESS NOTES
"Patient Information     Patient Name MRN Sex Danii Patel [id number] Female 1965      Progress Notes by Ruth Cano, PT at 2017  1:07 PM     Author:  Ruth Cano, PT Service:  (none) Author Type:  PT- Physical Therapist     Filed:  2/3/2017 10:15 AM Date of Service:  2017  1:07 PM Status:  Signed     :  Ruth Cano PT (PT- Physical Therapist)            [facility]  Outpatient PT - Daily Note    Date of Service: 2017   Visit #3  Insurance Auth: 20    PSFS Visit:  3/10  PSFS Completed:  17    Patient Name: Danii Henderson   YOB: 1965   Referring MD/Provider: Dr. Strauss  Medical and Treatment Diagnosis: Bilateral knee osteoarthritis, patellar tenonitis, meniscus tear  PT Treatment Diagnosis: decreased mobility  Insurance: [employer]  Start of Service: 17  Certification Dates: Start of Service: 17  Medicare/MA Re-Cert Due: 17           Subjective      Rates pain: 9-10/10 prior to pain pill today, currently 6/10.  Has a migraine today.  Could barely walk for a couple days after doing Ultrasound at last session.  \"Pain cream\" didn't help pain go away.       Follow up with physician:  2017      Objective      ROM / Strength:                 Norms Left  Right Strength Left  Right     52 56 Hip Flexion     Hip Extension 15   Hip Extension 4/5 3/5   Hip Abduction 50   Hip Abduction 4/5 4-/5   Hip External Rot. 60 58 60 Hip External Rot. 3+/5 3+/5   Hip Internal Rot. 40 22 22 Hip Internal Rot.     Knee Extension 0 +3 +3 Knee Extension 5/5 5/5 pain   Knee Flexion 135 130 127 Knee Flexion 4+/5 4+/5   Dorsiflexion 20   Dorsiflexion 5/5 5/5   Plantarflexion    Plantarflexion 5-/5 4+/5     Today's Intervention:      - Electrical stim with crossed electrodes placed at VMO/lateral gastroc and VLO/medial gastroc, 11 min.  Patient supine with large pillow under bilateral knees.  - Hot Packs to " posterior and anterior knees bilaterally (using 2 cervical packs), 11 min - simultaneous with E-stim    - prone quadriceps stretches, 20 sec x 3 BLE  - quad sets, 5 sec 10 x 1 BLE  - heel digs, 5 sec 10 x 1 R/L LE  - SLR, 5 x 1 R/L LE  * Added to HEP.  Patient was given a handout with picture and written instructions for exercise including guidelines for repetitions and frequency of performance.  Patient voiced understanding.      Next Visit:  Modalities prn for pain control, hip strengthening (add ABD), add knee flexion if tolerated    Home Exercise Program:    1-20-17:  Quad, hamstring, gastroc stretches  1-26-17:  Bridge, supine hip IR/ER  2-2-17:  Quad set, heel dig, SLR          Assessment    Therapist Assessment / Clinical Impression:  Exacerbation of pain after trial of US.  Change in modalities to Estim/HP for pain control.  Patient stated the estim and heat felt really good.  Progression of knee exercises.        Goals:  Patient goal:  Decreased knee pain with walking in 8 weeks.    Functional Short Term Goals (4 weeks):   - Patient will demonstrate correct performance of HEP with minimal to no cues required for increased BLE flexibility.      Functional Long Term Goals (8 weeks):   - Patient will report decreased pain from 8/10 to 0-4/10 with walking.  - Patient will demonstrate 4+/5 hip strength testing or better for improved stability with prolonged standing and walking.  - Patient will report minimal to no sleep disruption due to knee pain.        Completed 1-20-17:  Patient Specific Functional and Pain Scales (PSFS)  Clinician Instructions: Complete after the history and before the exam.   Initial Assessment:   We want to know what 3 activities in your life you are unable to perform, or are having the most difficulty performing, as a result of your chief problem. Please list and score at least 3 activities that you are unable to perform, or having the most difficulty performing, because of your chief  problem.   Patient Specific Activity Scoring Scheme (score one number for each activity):   Activity Score (0-10)  0= Unable to perform activity  10= Able to perform activity at same level as before injury or problem   1. walking 4/10   2. sleeping 6/10   3. Household tasks 6/10   Totals:  5.3/10   Patient verbally states that they understand that the information they have provided above is current and complete to the best of their knowledge.   Patient Specific Functional Scale Modifier Scale Conversion: (patient's modifier that correlates with pt's score on PSFS): 5-CK (50% Impaired).  Initial Primary G Code and Modifier:    Per the Patient's intake and/or assessment the Primary G Code is: Mobility .   The Patient's Impairment, Limitation or Restriction Modifier would be best described as: CK - 40% - 60% Impairment.   Goal Primary G Code and Modifier:    The Patient's G Code Goal would be: Mobility    The Patient's Impairment, Limitation or Restriction Modifier goal would be best described as: CI - 1% - 20% Impairment.            Response to Intervention:  Good tolerance to treatment         Plan    Treatment Plan:      Frequency:   16 visits    Duration of Treatment: 8 weeks    Planned Interventions:    Home Exercise Program development  Therapeutic Exercise (ROM & Strengthening)  Therapeutic Activities  Neuromuscular Re-education  Manual Therapy  Ultrasound  E-Stim  Gait Training  Hot Packs / Cold Pack Modalities  Vasopneumatic Compression with Cold Therapy ( Game Ready )  Phonophoresis with Ketoprofen  Iontophoresis with Dexamethasone  Mechanical Traction    Plan for next visit:  See above.

## 2018-01-03 NOTE — PROGRESS NOTES
Patient Information     Patient Name MRN Sex Danii Patel [id number] Female 1965      Progress Notes by Ruth Cano PT at 2017  1:07 PM     Author:  Ruth Cano PT Service:  (none) Author Type:  PT- Physical Therapist     Filed:  2017  1:51 PM Date of Service:  2017  1:07 PM Status:  Signed     :  Ruth Cano PT (PT- Physical Therapist)            [facility]  Outpatient PT - Daily Note    Date of Service: 2017   Visit #4  Insurance Auth:     PSFS Visit:  4/10  PSFS Completed:  17    Patient Name: Danii Henderson   YOB: 1965   Referring MD/Provider: Dr. Strauss  Medical and Treatment Diagnosis: Bilateral knee osteoarthritis, patellar tenonitis, meniscus tear  PT Treatment Diagnosis: decreased mobility  Insurance: [facility]  Start of Service: 17  Certification Dates: Start of Service: 17  Medicare/MA Re-Cert Due: 17           Subjective      Rates pain: 3/10.  Got a new knee brace for right knee (about 1 week ago).  Seems to be helping.  Felt the e-stim and heat really helped as well, with improvement for a couple of days.  Continues to struggle with migraine headaches.  Has a headache today.  Patient reports compliance with stretching every other day.       Follow up with physician:  2017      Objective    Today's Intervention:      - Electrical stim with crossed electrodes placed at VMO/lateral gastroc and VLO/medial gastroc, 20 min plus set-up time.  Patient supine with large pillow under bilateral knees.  - Hot Packs to posterior and anterior knees bilaterally (using 2 cervical packs), 20 min - simultaneous with E-stim    - prone quadriceps stretches, 20 sec x 4 BLE    - Brief review of HEP.  No further exercises completed due to time limitation and patient has a migraine.  - Patient able to don/doff brace independently.     Next Visit:  Modalities prn for pain control, hip  strengthening (add ABD), add knee flexion if tolerated    Home Exercise Program:    1-20-17:  Quad, hamstring, gastroc stretches  1-26-17:  Bridge, supine hip IR/ER  2-2-17:  Quad set, heel dig, SLR          Assessment    Therapist Assessment / Clinical Impression:  Decreased pain with e-stim, heat, and use of new brace for right knee.        Goals:  Patient goal:  Decreased knee pain with walking in 8 weeks.    Functional Short Term Goals (4 weeks):   - Patient will demonstrate correct performance of HEP with minimal to no cues required for increased BLE flexibility.      Functional Long Term Goals (8 weeks):   - Patient will report decreased pain from 8/10 to 0-4/10 with walking.  - Patient will demonstrate 4+/5 hip strength testing or better for improved stability with prolonged standing and walking.  - Patient will report minimal to no sleep disruption due to knee pain.        Completed 1-20-17:  Patient Specific Functional and Pain Scales (PSFS)  Clinician Instructions: Complete after the history and before the exam.   Initial Assessment:   We want to know what 3 activities in your life you are unable to perform, or are having the most difficulty performing, as a result of your chief problem. Please list and score at least 3 activities that you are unable to perform, or having the most difficulty performing, because of your chief problem.   Patient Specific Activity Scoring Scheme (score one number for each activity):   Activity Score (0-10)  0= Unable to perform activity  10= Able to perform activity at same level as before injury or problem   1. walking 4/10   2. sleeping 6/10   3. Household tasks 6/10   Totals:  5.3/10   Patient verbally states that they understand that the information they have provided above is current and complete to the best of their knowledge.   Patient Specific Functional Scale Modifier Scale Conversion: (patient's modifier that correlates with pt's score on PSFS): 5-CK (50%  Impaired).  Initial Primary G Code and Modifier:    Per the Patient's intake and/or assessment the Primary G Code is: Mobility .   The Patient's Impairment, Limitation or Restriction Modifier would be best described as: CK - 40% - 60% Impairment.   Goal Primary G Code and Modifier:    The Patient's G Code Goal would be: Mobility    The Patient's Impairment, Limitation or Restriction Modifier goal would be best described as: CI - 1% - 20% Impairment.            Response to Intervention:  Good tolerance to treatment         Plan    Treatment Plan:      Frequency:   16 visits    Duration of Treatment: 8 weeks    Planned Interventions:    Home Exercise Program development  Therapeutic Exercise (ROM & Strengthening)  Therapeutic Activities  Neuromuscular Re-education  Manual Therapy  Ultrasound  E-Stim  Gait Training  Hot Packs / Cold Pack Modalities  Vasopneumatic Compression with Cold Therapy ( Game Ready )  Phonophoresis with Ketoprofen  Iontophoresis with Dexamethasone  Mechanical Traction    Plan for next visit:  See above.

## 2018-01-03 NOTE — DISCHARGE SUMMARY
Patient Information     Patient Name MRN Danii Adams [id number] Female 1965      Discharge Summaries by Ruth Cano PT at 3/22/2017  9:22 AM     Author:  Ruth Cano PT Service:  (none) Author Type:  PT- Physical Therapist     Filed:  3/22/2017  9:25 AM Date of Service:  3/22/2017  9:22 AM Status:  Signed     :  Ruth Cano PT (PT- Physical Therapist)              [facility]  Outpatient PT - Discharge Summary    Date:  3-22-17    Dates of Service: 17    Thru:  3-2-17  Number of visits: 7   Physician:  Dr. Strauss  Diagnosis:  Bilateral knee OA, patellar tendonitis, meniscus tear            Reason for Discharge:  Patient cancelled all visits stating her physician told her she no longer needs therapy.      Progress from Initial to Discharge(if applicable):  Patient was last seen on 3-2-17.  Please see that note for more information.  Patient cancelled a couple of visits due to a death in the family, and no-showed the next scheduled visit.  Cancelled yesterday due to therapist having to leave the office, and patient then let reception know she could be discharged from therapy.    Goals and PSFS were unable to be reassessed as discharge was unplanned.      Further Recommendations:      None      Patient is discharged from Physical Therapy

## 2018-01-03 NOTE — PROGRESS NOTES
Patient Information     Patient Name MRN Danii Adams [id number] Female 1965      Progress Notes by Ruth Cano PT at 2017 10:56 AM     Author:  Ruth Cano, PT Service:  (none) Author Type:  PT- Physical Therapist     Filed:  2017 10:56 AM Date of Service:  2017 10:56 AM Status:  Signed     :  Ruth Cano PT (PT- Physical Therapist)            [facility]  Outpatient PT - Cancellation Note    Date:  17      Patient cancelled 17 visit due to illness.      Next visit scheduled:  17      Ruth Cano PT ....................  2017   10:56 AM

## 2018-01-03 NOTE — PROGRESS NOTES
Patient Information     Patient Name MRN Danii Adams [id number] Female 1965      Progress Notes by Ruth Cano, PT at 2017  2:23 PM     Author:  Ruth Cano, PT Service:  (none) Author Type:  PT- Physical Therapist     Filed:  2017  2:24 PM Date of Service:  2017  2:23 PM Status:  Signed     :  Ruth Cano PT (PT- Physical Therapist)            [facility]  Outpatient PT - Cancellation Note    Date:  17      Patient cancelled 17 visit due to recent fall and cannot find someone to give her a ride to therapy.      Next visit scheduled:  17      Ruth Cano, PT ....................  2017   2:24 PM

## 2018-01-03 NOTE — INITIAL ASSESSMENTS
"Patient Information     Patient Name MRN Sex Danii Patel [id number] Female 1965      Initial Assessments by Ruth Cano PT at 2017  3:10 PM     Author:  Ruth Cano PT Service:  (none) Author Type:  PT- Physical Therapist     Filed:  2017  4:09 PM Date of Service:  2017  3:10 PM Status:  Signed     :  Ruth Cano PT (PT- Physical Therapist)            [facility]  Outpatient PT - Initial Evaluation  Lower Extremity Eval    Date of Service: 2017   Visit #1  Insurance Auth: 20    PSFS Visit:  1/10  PSFS Completed:  17    Patient Name: Danii Henderson   YOB: 1965   Referring MD/Provider: Dr. Strauss  Medical and Treatment Diagnosis: Bilateral knee osteoarthritis, patellar tenonitis, meniscus tear  PT Treatment Diagnosis: decreased mobility  Insurance: [facility]  Start of Service: 17  Certification Dates: Start of Service: 17  Medicare/MA Re-Cert Due: 17     Precautions:  None   Cognition:  Oriented to Person, Place, and Time.     Were cultural / age or other special adaptations needed? No      Patient is a vulnerable adult: No      Patient is aware of diagnosis: Yes      Risks and benefits explained: Yes        Subjective        Date of onset: At least a couple of years ago.  Recently has gotten worse, over the past 6 months or so.  Can barely walk in the morning.  Wakes from sleep due to pain.  Has been using \"Penetrex\" (OTC cream).  Started using insoles for shoes last week ([employer] brand).  Was fit for a knee brace yesterday for the right knee.  Not sure how long before she gets the brace.  Tried water therapy at [employer], [facility].  States this seemed to help.    Follow up with physician:  2017    Rates pain: 8/10 average, right knee worse than left  Describes pain: throbbing, piercing, sharp  Locates pain: whole knee, cannot point to one spot - uses cream on " front and back  Aggravating: walking (worse after 15-20 min), sleep (wakes 2x/night minimum)  Easing: sitting, oxycodone (4x/day) and oxycontin (3x/day), OTC cream, ice (daily), active knee flex/ext in sitting      Completed 1-20-17:  Patient Specific Functional and Pain Scales (PSFS)  Clinician Instructions: Complete after the history and before the exam.   Initial Assessment:   We want to know what 3 activities in your life you are unable to perform, or are having the most difficulty performing, as a result of your chief problem. Please list and score at least 3 activities that you are unable to perform, or having the most difficulty performing, because of your chief problem.   Patient Specific Activity Scoring Scheme (score one number for each activity):   Activity Score (0-10)  0= Unable to perform activity  10= Able to perform activity at same level as before injury or problem   1. walking 4/10   2. sleeping 6/10   3. Household tasks 6/10   Totals:  5.3/10   Patient verbally states that they understand that the information they have provided above is current and complete to the best of their knowledge.   Patient Specific Functional Scale Modifier Scale Conversion: (patient's modifier that correlates with pt's score on PSFS): 5-CK (50% Impaired).  Initial Primary G Code and Modifier:    Per the Patient's intake and/or assessment the Primary G Code is: Mobility .   The Patient's Impairment, Limitation or Restriction Modifier would be best described as: CK - 40% - 60% Impairment.   Goal Primary G Code and Modifier:    The Patient's G Code Goal would be: Mobility    The Patient's Impairment, Limitation or Restriction Modifier goal would be best described as: CI - 1% - 20% Impairment.           Prior Level:  Minimal to no difficulty completing functional activities.        Past Medical History     Diagnosis  Date     Unspecified Migraine without Mention of Intractable Migraine      Past Surgical History        Procedure   Laterality Date     Tubal ligation        Ankle fracture treatment        left            Driving:  yes  Home Environment:  Patient lives in a trailer home with 4 steps to enter and 0 flights up/down stairs.  Assistive Devices: SEC, uses daily, usually in the morning when pain is worse.  Periodically in daytime outside.    Occupation:  NA    Previous Treatment:    Pain Meds / Anti-inflammatory Meds  - Yes - see above  Physical Therapy - pool therapy  Injections - Has had several injections bilaterally and [facility] interventional pain clinic.  No relief.  Surgery - No  Pain Clinic - Yes    Diagnostics:  Reviewed (see chart); Had 4 x-rays recently at [facility]  - Medial joint space narrowing and suprapatellar effusion noted bilaterally.   Current Medications:  Reviewed (see chart)    Drug Allergies:  Reviewed (see chart)  ?   Latex Allergy:  No           Objective    Items left blank indicate that the test was inappropriate or not meaningful at the time of evaluation and therefore not performed.    Fall Risk Screening:  No risk factors identified      ROM / Strength:                 Norms Left  Right Strength Left  Right     52 56 Hip Flexion     Hip Extension 15   Hip Extension 4/5 3/5   Hip Abduction 50   Hip Abduction 4/5 4-/5   Hip External Rot. 60 58 60 Hip External Rot. 3+/5 3+/5   Hip Internal Rot. 40 22 22 Hip Internal Rot.     Knee Extension 0 +3 +3 Knee Extension 5/5 5/5 pain   Knee Flexion 135 130 127 Knee Flexion 4+/5 4+/5   Dorsiflexion 20   Dorsiflexion 5/5 5/5   Plantarflexion    Plantarflexion 5-/5 4+/5     Knee Circumference:   L R   5 cm prox to sup pat pole 40.5 cm 41 cm   Joint line 35 36   10 cm distal to inf pat pole 36 35.5         Observation:  Limited quadriceps flexibility bilaterally.  Decreased gastroc flexibility bilaterally.    Palpation:  Minimal pain with palpation to infrapatellar tendon, medial and lateral joint line, and posterior knee.    Gait:   Genu valgum bilaterally, foot pronation.      Special Tests:  NT        Today's Intervention:    - Evaluation  - Patient education for results of evaluation, goals, and treatment plan.  Patient voices understanding and agreement.    - standing quadriceps and gastroc stretches, seated hamstring stretches  Demonstration and multiple cues needed for correct technique.    * Added to HEP.  Patient was given a handout with picture and written instructions for exercise including guidelines for repetitions and frequency of performance.  Patient voiced understanding.    - Recommended continued use of orthotics.    Next Visit:  Modalities prn for pain control, progression of flexibility, hip strengthening    Home Exercise Program:    1-20-17:  Quad, hamstring, gastroc stretches          Assessment    Therapist Assessment / Clinical Impression:  Signs and symptoms consistent with above diagnosis.  Patient demonstrates decreased flexibility bilaterally, decreased hip and knee strength, pain with resisted motions at knees, genu valgum alignment and pronated feet.      Functional Impairment(s): See subjective on initial evaluation and Functional Assessment / Summary Report from PSFS.          Goals:  Patient goal:  Decreased knee pain with walking in 8 weeks.    Functional Short Term Goals (4 weeks):   - Patient will demonstrate correct performance of HEP with minimal to no cues required for increased BLE flexibility.      Functional Long Term Goals (8 weeks):   - Patient will report decreased pain from 8/10 to 0-4/10 with walking.  - Patient will demonstrate 4+/5 hip strength testing or better for improved stability with prolonged standing and walking.  - Patient will report minimal to no sleep disruption due to knee pain.        Patient participated in goal selection and understand(s) the plan of care: Yes   Patient Potential for Achieving Desired Outcome: Good       Response to Intervention:  Good tolerance to treatment          Plan    Treatment Plan:      Frequency:   16 visits    Duration of Treatment: 8 weeks    Planned Interventions:    Home Exercise Program development  Therapeutic Exercise (ROM & Strengthening)  Therapeutic Activities  Neuromuscular Re-education  Manual Therapy  Ultrasound  E-Stim  Gait Training  Hot Packs / Cold Pack Modalities  Vasopneumatic Compression with Cold Therapy ( Game Ready )  Phonophoresis with Ketoprofen  Iontophoresis with Dexamethasone  Mechanical Traction    Plan for next visit:  See above.    Thank you for your referral to [facility].  Please call with any questions, concerns or comments.  [phone]    The signature, of the referring medical provider, on this document indicates certification of the above prescribed plan of care and is medically necessary.        X____________________________________________________    Physician Signature                     Date  Time

## 2018-01-03 NOTE — PROGRESS NOTES
Patient Information     Patient Name MRN Sex Danii Patel [id number] Female 1965      Progress Notes by Ruth Cano PT at 2017 12:53 PM     Author:  Ruth Cano PT Service:  (none) Author Type:  PT- Physical Therapist     Filed:  2017  1:49 PM Date of Service:  2017 12:53 PM Status:  Signed     :  Ruth Cano PT (PT- Physical Therapist)            [facility]  Outpatient PT - Daily Note    Date of Service: 2017   Visit #2  Insurance Auth:     PSFS Visit:  2/10  PSFS Completed:  17    Patient Name: Danii Henderson   YOB: 1965   Referring MD/Provider: Dr. Strauss  Medical and Treatment Diagnosis: Bilateral knee osteoarthritis, patellar tenonitis, meniscus tear  PT Treatment Diagnosis: decreased mobility  Insurance: [employer]  Start of Service: 17  Certification Dates: Start of Service: 17  Medicare/MA Re-Cert Due: 17           Subjective      Rates pain: 8-9/10.  Reports compliance with stretches.  States she did 20 reps of each the 1st day and could barely walk the next day.     Follow up with physician:  2017      Objective      ROM / Strength:                 Norms Left  Right Strength Left  Right     52 56 Hip Flexion     Hip Extension 15   Hip Extension 4/5 3/5   Hip Abduction 50   Hip Abduction 4/5 4-/5   Hip External Rot. 60 58 60 Hip External Rot. 3+/5 3+/5   Hip Internal Rot. 40 22 22 Hip Internal Rot.     Knee Extension 0 +3 +3 Knee Extension 5/5 5/5 pain   Knee Flexion 135 130 127 Knee Flexion 4+/5 4+/5   Dorsiflexion 20   Dorsiflexion 5/5 5/5   Plantarflexion    Plantarflexion 5-/5 4+/5     Today's Intervention:      - Clarification of stretching technique:  20 sec holds, 3-5 reps of each.  Patient voiced understanding.    - ULTRASOUND: 1 MHz at 1.4 w/cm sq., continuous x 20 minutes plus set-up time to bilateral knees.  Patient in supine position with large  pillow under knees.    - standing quadriceps stretches, 20 sec x 3 BLE  - *supine bridge, 10 x 1  - *supine hip IR/ER, 5 sec holds 10 x 1  BLE  * Added to HEP.  Patient was given a handout with picture and written instructions for exercise including guidelines for repetitions and frequency of performance.  Patient voiced understanding.      Next Visit:  Modalities prn for pain control, progression of flexibility, hip strengthening    Home Exercise Program:    1-20-17:  Quad, hamstring, gastroc stretches  1-26-17:  Bridge, supine hip IR/ER          Assessment    Therapist Assessment / Clinical Impression:  Trial of US for knee pain.  Started hip stabilization exercises today.        Goals:  Patient goal:  Decreased knee pain with walking in 8 weeks.    Functional Short Term Goals (4 weeks):   - Patient will demonstrate correct performance of HEP with minimal to no cues required for increased BLE flexibility.      Functional Long Term Goals (8 weeks):   - Patient will report decreased pain from 8/10 to 0-4/10 with walking.  - Patient will demonstrate 4+/5 hip strength testing or better for improved stability with prolonged standing and walking.  - Patient will report minimal to no sleep disruption due to knee pain.        Completed 1-20-17:  Patient Specific Functional and Pain Scales (PSFS)  Clinician Instructions: Complete after the history and before the exam.   Initial Assessment:   We want to know what 3 activities in your life you are unable to perform, or are having the most difficulty performing, as a result of your chief problem. Please list and score at least 3 activities that you are unable to perform, or having the most difficulty performing, because of your chief problem.   Patient Specific Activity Scoring Scheme (score one number for each activity):   Activity Score (0-10)  0= Unable to perform activity  10= Able to perform activity at same level as before injury or problem   1. walking 4/10   2.  sleeping 6/10   3. Household tasks 6/10   Totals:  5.3/10   Patient verbally states that they understand that the information they have provided above is current and complete to the best of their knowledge.   Patient Specific Functional Scale Modifier Scale Conversion: (patient's modifier that correlates with pt's score on PSFS): 5-CK (50% Impaired).  Initial Primary G Code and Modifier:    Per the Patient's intake and/or assessment the Primary G Code is: Mobility .   The Patient's Impairment, Limitation or Restriction Modifier would be best described as: CK - 40% - 60% Impairment.   Goal Primary G Code and Modifier:    The Patient's G Code Goal would be: Mobility    The Patient's Impairment, Limitation or Restriction Modifier goal would be best described as: CI - 1% - 20% Impairment.            Response to Intervention:  Good tolerance to treatment         Plan    Treatment Plan:      Frequency:   16 visits    Duration of Treatment: 8 weeks    Planned Interventions:    Home Exercise Program development  Therapeutic Exercise (ROM & Strengthening)  Therapeutic Activities  Neuromuscular Re-education  Manual Therapy  Ultrasound  E-Stim  Gait Training  Hot Packs / Cold Pack Modalities  Vasopneumatic Compression with Cold Therapy ( Game Ready )  Phonophoresis with Ketoprofen  Iontophoresis with Dexamethasone  Mechanical Traction    Plan for next visit:  See above.

## 2018-01-17 RX ORDER — IBUPROFEN 800 MG/1
800 TABLET, FILM COATED ORAL 3 TIMES DAILY PRN
COMMUNITY

## 2018-01-17 RX ORDER — PROPRANOLOL HYDROCHLORIDE 120 MG/1
120 CAPSULE, EXTENDED RELEASE ORAL DAILY
COMMUNITY

## 2018-02-05 ENCOUNTER — AMBULATORY - GICH (OUTPATIENT)
Dept: RADIOLOGY | Facility: OTHER | Age: 53
End: 2018-02-05

## 2018-02-05 DIAGNOSIS — M54.50 LOW BACK PAIN: ICD-10-CM

## 2018-02-08 ENCOUNTER — HOSPITAL ENCOUNTER (OUTPATIENT)
Dept: RADIOLOGY | Facility: OTHER | Age: 53
End: 2018-02-08

## 2018-02-08 DIAGNOSIS — M54.50 LOW BACK PAIN: ICD-10-CM

## 2018-02-12 NOTE — DISCHARGE SUMMARY
Patient Information     Patient Name MRN Danii Adams [id number] Female 1965      Discharge Summaries by Ruth Cano PT at 2017 12:48 PM     Author:  Ruth Cano PT Service:  (none) Author Type:  PT- Physical Therapist     Filed:  2017 12:50 PM Date of Service:  2017 12:48 PM Status:  Signed     :  Ruth Cano PT (PT- Physical Therapist)              [facility]  Outpatient PT - Discharge Summary    Date:  17    Dates of Service: 17    Thru:  10-19-17  Number of visits: 3   Physician:  Dr. Strauss  Diagnosis:  Bilateral knee pain            Reason for Discharge:  Patient has not been seen in greater than 30 days.      Progress from Initial to Discharge(if applicable):  Patient was last seen on 10-19-17.  Please see that note for more information.  Minimal progress made in initial 3 visits.  Scheduled to get stem cells in mid-October.  Patient did not return for therapy.    Goals and PSFS were unable to be reassessed as discharge was unplanned.      Further Recommendations:      None       Patient is discharged from Physical Therapy

## 2018-02-13 NOTE — PROGRESS NOTES
Patient Information     Patient Name MRN Danii Adams [id number] Female 1965      Progress Notes by Christiana Matute at 2018  1:26 PM     Author:  Christiana Matute Service:  (none) Author Type:  Other Clinical Staff     Filed:  2018  1:26 PM Date of Service:  2018  1:26 PM Status:  Signed     :  Christiana Matute (Other Clinical Staff)            Falls Risk Criteria:    Age 65 and older or under age 4        Sensory deficits    Poor vision    Use of ambulatory aides    Impaired judgment    Unable to walk independently    Meets High Risk criteria for falls:  no

## 2018-03-07 DIAGNOSIS — M51.369 DDD (DEGENERATIVE DISC DISEASE), LUMBAR: Primary | ICD-10-CM

## 2021-08-06 ENCOUNTER — HOSPITAL ENCOUNTER (OUTPATIENT)
Facility: OTHER | Age: 56
End: 2021-08-06
Attending: SURGERY | Admitting: SURGERY
Payer: MEDICARE

## 2021-08-06 DIAGNOSIS — Z12.11 SCREENING FOR COLON CANCER: Primary | ICD-10-CM

## 2021-08-06 NOTE — TELEPHONE ENCOUNTER
Screening Questions for the Scheduling of Screening Colonoscopies   (If Colonoscopy is diagnostic, Provider should review the chart before scheduling.)  Are you younger than 50 or older than 80?   NO   Do you take aspirin or fish oil?  YES - ASPIRIN   (if yes, tell patient to stop 1 week prior to Colonoscopy)  Do you take warfarin (Coumadin), clopidogrel (Plavix), apixaban (Eliquis), dabigatram (Pradaxa), rivaroxaban (Xarelto) or any blood thinner? NO   Do you use oxygen at home?   NO   Do you have kidney disease? NO   Are you on dialysis? NO   Have you had a stroke or heart attack in the last year? NO   Have you had a stent in your heart or any blood vessel in the last year? NO   Have you had a transplant of any organ? NO   Have you had a colonoscopy or upper endoscopy (EGD) before? YES          When?  6 YRS AGO   Date of scheduled Colonoscopy. 10/05/2021  Provider [facility]   Pharmacy  [facility]

## 2021-08-09 RX ORDER — POLYETHYLENE GLYCOL 3350, SODIUM CHLORIDE, SODIUM BICARBONATE, POTASSIUM CHLORIDE 420; 11.2; 5.72; 1.48 G/4L; G/4L; G/4L; G/4L
4000 POWDER, FOR SOLUTION ORAL ONCE
Qty: 4000 ML | Refills: 0 | Status: SHIPPED | OUTPATIENT
Start: 2021-08-09 | End: 2021-08-09

## 2021-08-09 RX ORDER — BISACODYL 5 MG/1
TABLET, DELAYED RELEASE ORAL
Qty: 2 TABLET | Refills: 0 | Status: SHIPPED | OUTPATIENT
Start: 2021-08-09

## 2021-09-28 ENCOUNTER — TELEPHONE (OUTPATIENT)
Dept: SURGERY | Facility: OTHER | Age: 56
End: 2021-09-28

## 2021-09-28 RX ORDER — ASPIRIN 81 MG/1
81 TABLET ORAL DAILY
COMMUNITY
End: 2021-10-04 | Stop reason: HOSPADM

## 2021-09-28 RX ORDER — AZELASTINE 1 MG/ML
1 SPRAY, METERED NASAL 2 TIMES DAILY
COMMUNITY
End: 2021-10-04 | Stop reason: HOSPADM

## 2021-09-28 RX ORDER — OXYCODONE HYDROCHLORIDE 15 MG/1
5 TABLET ORAL 3 TIMES DAILY
COMMUNITY
End: 2021-10-04 | Stop reason: HOSPADM

## 2021-09-28 RX ORDER — FLUTICASONE PROPIONATE 50 MCG
1 SPRAY, SUSPENSION (ML) NASAL DAILY
COMMUNITY
End: 2021-10-04 | Stop reason: HOSPADM

## 2021-09-28 RX ORDER — DOXEPIN HYDROCHLORIDE 10 MG/1
10 CAPSULE ORAL AT BEDTIME
COMMUNITY
End: 2021-10-04 | Stop reason: HOSPADM

## 2021-09-28 RX ORDER — ATORVASTATIN CALCIUM 10 MG/1
10 TABLET, FILM COATED ORAL DAILY
COMMUNITY
End: 2021-10-04 | Stop reason: HOSPADM

## 2021-09-28 RX ORDER — ZOLPIDEM TARTRATE 10 MG/1
10 TABLET ORAL
COMMUNITY
End: 2021-10-04 | Stop reason: HOSPADM

## 2021-09-28 RX ORDER — VENLAFAXINE 75 MG/1
75 TABLET ORAL DAILY
COMMUNITY
End: 2021-10-04 | Stop reason: HOSPADM

## 2021-09-28 RX ORDER — PRAZOSIN HYDROCHLORIDE 2 MG/1
2 CAPSULE ORAL AT BEDTIME
COMMUNITY
End: 2021-10-04 | Stop reason: HOSPADM

## 2021-09-28 RX ORDER — CYANOCOBALAMIN (VITAMIN B-12) 250 MCG
TABLET ORAL
COMMUNITY
End: 2021-10-04 | Stop reason: HOSPADM

## 2021-09-28 RX ORDER — ACETAMINOPHEN 325 MG/1
325-650 TABLET ORAL EVERY 6 HOURS PRN
COMMUNITY
End: 2021-10-04 | Stop reason: HOSPADM

## 2021-09-28 RX ORDER — OXYCODONE HCL 10 MG/1
10 TABLET, FILM COATED, EXTENDED RELEASE ORAL 2 TIMES DAILY
COMMUNITY
End: 2021-10-04 | Stop reason: HOSPADM

## 2021-09-28 RX ORDER — ONDANSETRON 4 MG/1
TABLET, FILM COATED ORAL DAILY PRN
COMMUNITY
End: 2021-10-04 | Stop reason: HOSPADM

## 2021-09-28 NOTE — TELEPHONE ENCOUNTER
Screening Questions for the Scheduling of Screening Colonoscopies   (If Colonoscopy is diagnostic, Provider should review the chart before scheduling.)  Are you younger than 50 or older than 80?   NO  Do you take aspirin or fish oil?  NO  (if yes, tell patient to stop 1 week prior to Colonoscopy)  Do you take warfarin (Coumadin), clopidogrel (Plavix), apixaban (Eliquis), dabigatram (Pradaxa), rivaroxaban (Xarelto) or any blood thinner? NO   Do you use oxygen at home?  NO   Do you have kidney disease? NO   Are you on dialysis? NO   Have you had a stroke or heart attack in the last year? NO   Have you had a stent in your heart or any blood vessel in the last year? NO   Have you had a transplant of any organ? NO   Have you had a colonoscopy or upper endoscopy (EGD) before? NO          When?    Date of scheduled EGD  - 11/22/2021  Provider [facility]   Pharmacy

## 2021-09-30 RX ORDER — LIDOCAINE 40 MG/G
CREAM TOPICAL
Status: CANCELLED | OUTPATIENT
Start: 2021-09-30

## 2021-09-30 RX ORDER — SODIUM CHLORIDE, SODIUM LACTATE, POTASSIUM CHLORIDE, CALCIUM CHLORIDE 600; 310; 30; 20 MG/100ML; MG/100ML; MG/100ML; MG/100ML
INJECTION, SOLUTION INTRAVENOUS CONTINUOUS
Status: CANCELLED | OUTPATIENT
Start: 2021-09-30

## 2021-10-01 ENCOUNTER — ALLIED HEALTH/NURSE VISIT (OUTPATIENT)
Dept: FAMILY MEDICINE | Facility: OTHER | Age: 56
End: 2021-10-01
Attending: FAMILY MEDICINE
Payer: MEDICARE

## 2021-10-01 DIAGNOSIS — Z20.822 COVID-19 RULED OUT: Primary | ICD-10-CM

## 2021-10-01 PROCEDURE — U0003 INFECTIOUS AGENT DETECTION BY NUCLEIC ACID (DNA OR RNA); SEVERE ACUTE RESPIRATORY SYNDROME CORONAVIRUS 2 (SARS-COV-2) (CORONAVIRUS DISEASE [COVID-19]), AMPLIFIED PROBE TECHNIQUE, MAKING USE OF HIGH THROUGHPUT TECHNOLOGIES AS DESCRIBED BY CMS-2020-01-R: HCPCS | Mod: ZL

## 2021-10-01 PROCEDURE — C9803 HOPD COVID-19 SPEC COLLECT: HCPCS

## 2021-10-03 LAB — SARS-COV-2 RNA RESP QL NAA+PROBE: NEGATIVE

## 2021-10-23 ENCOUNTER — HEALTH MAINTENANCE LETTER (OUTPATIENT)
Age: 56
End: 2021-10-23

## 2022-10-10 ENCOUNTER — HEALTH MAINTENANCE LETTER (OUTPATIENT)
Age: 57
End: 2022-10-10

## 2022-11-27 ENCOUNTER — HEALTH MAINTENANCE LETTER (OUTPATIENT)
Age: 57
End: 2022-11-27

## 2023-04-25 ENCOUNTER — OFFICE VISIT (OUTPATIENT)
Dept: OTOLARYNGOLOGY | Facility: OTHER | Age: 58
End: 2023-04-25
Attending: OTOLARYNGOLOGY
Payer: MEDICARE

## 2023-04-25 DIAGNOSIS — J34.1 CYST AND MUCOCELE OF NOSE AND NASAL SINUS: Primary | ICD-10-CM

## 2023-04-25 PROCEDURE — G0463 HOSPITAL OUTPT CLINIC VISIT: HCPCS

## 2023-04-27 NOTE — PROGRESS NOTES
document embedded image  Patient Name: Danii Henderson    Address: [address] [place] [address]     YOB: 1965    [place] [address]    MR Number: [id number]    Phone: [phone]  PCP: Barbara Trevizo CNP            Appointment Date: 04/25/23   Visit Provider: Venu Ruiz MD    cc: Barbara Trevizo CNP; ~    ENT Progress Note  Intake  Visit Reasons: Sinus congestion / CT, Bringing report    HPI  History of Present Illness  Chief complaint:  Nasal symptoms    History  The patient is a 57-year-old female who presents to the office with a history of recurring epistaxis, primarily from the right side.  In addition, she is been treated with antibiotics 3 times since Christmas for sinusitis.  She presents today with some persisting low-grade pressure in her mid face with some mild swelling of her central face.  She denies nasal obstruction or purulence nasal drainage.  She admits to some clear nasal drainage.  She had positive allergy testing to dogs and cats a couple of years ago.  She does live with a dog in the house.  She is been on Flonase for 2 years.  She is on as azelastine nasal spray as well.  She does take a daily antihistamine.  She had a recent head CT performed at the [facility] for other reasons.  She was subsequently told she needs excisional biopsy of a lesion from her sinus.  The entire set of films are not available for review but she did have a photograph of the image in question.  It is a coronal view of her sinuses that shows benign appearing mucous retention cyst in the right maxillary sinus with the other sinuses clear.  There is no evidence of bone erosion or destruction.    Exam  Head and neck integument-I am unable to verify any asymmetric facial swelling at this time  Nasal-her septum is midline.  There is no purulence or polyps noted intranasally.  She has mildly boggy pale nasal membranes throughout.  Oral cavity oropharynx-free of mucosal lesions or  inflammation  Head and neck integument-Clear  Neck-no palpable masses or adenopathy  General-the patient appears well and in no distress  Neuro-there are no focal cranial nerve deficits    A&P  Assessment & Plan  (1) Mucous retention cyst of maxillary sinus:        Status: Acute        Code(s):  J34.1 - Cyst and mucocele of nose and nasal sinus  The patient was reassured that her CT findings are consistent with a benign mucous retention cyst in no surgical intervention is necessary.  This finding is most consistent with her known history of allergy.  I see no evidence of chronic sinusitis.               Venu Ruiz MD    Filed: 04/26/23 1258    <Electronically signed by Venu Ruiz MD> 04/26/23 1257

## 2023-08-17 ENCOUNTER — HOSPITAL ENCOUNTER (OUTPATIENT)
Dept: MRI IMAGING | Facility: OTHER | Age: 58
Discharge: HOME | End: 2023-08-17
Admitting: RADIOLOGY
Payer: MEDICARE

## 2023-08-17 DIAGNOSIS — M48.02 CERVICAL SPINAL STENOSIS: ICD-10-CM

## 2023-08-17 PROCEDURE — 72141 MRI NECK SPINE W/O DYE: CPT

## 2023-08-27 ENCOUNTER — MEDICAL CORRESPONDENCE (OUTPATIENT)
Dept: HEALTH INFORMATION MANAGEMENT | Facility: OTHER | Age: 58
End: 2023-08-27
Payer: MEDICARE

## 2023-08-28 ENCOUNTER — TRANSFERRED RECORDS (OUTPATIENT)
Dept: HEALTH INFORMATION MANAGEMENT | Facility: OTHER | Age: 58
End: 2023-08-28
Payer: MEDICARE

## 2023-08-28 ENCOUNTER — MEDICAL CORRESPONDENCE (OUTPATIENT)
Dept: HEALTH INFORMATION MANAGEMENT | Facility: OTHER | Age: 58
End: 2023-08-28
Payer: MEDICARE

## 2023-10-28 ENCOUNTER — HEALTH MAINTENANCE LETTER (OUTPATIENT)
Age: 58
End: 2023-10-28

## 2023-11-28 ENCOUNTER — OFFICE VISIT (OUTPATIENT)
Dept: OTOLARYNGOLOGY | Facility: OTHER | Age: 58
End: 2023-11-28
Payer: MEDICARE

## 2023-11-28 DIAGNOSIS — M50.30 DDD (DEGENERATIVE DISC DISEASE), CERVICAL: Primary | ICD-10-CM

## 2023-11-28 PROCEDURE — G0463 HOSPITAL OUTPT CLINIC VISIT: HCPCS

## 2023-12-01 NOTE — PROGRESS NOTES
document embedded image  Patient Name: Danii Henderson    Address: [address] [place] [address]     YOB: 1965    [place] [address]    MR Number: [id number]    Phone: [phone]  PCP: Barbara Trevizo CNP            Appointment Date: 11/28/23   Visit Provider: Venu Ruiz MD    cc: Barbara Trevizo CNP; ~    ENT Progress Note  Intake  Visit Reasons: vocal cord check for ortho surgery    HPI  History of Present Illness  Chief complaint:  History of previous cervical fusion    History  The patient is a 58-year-old female who is referred by Dr. Long from orthopedic/neurosurgery for evaluation of vocal cords prior to proceeding with cervical fusion surgery.  She had a previous anterior cervical fusion on the right in 2012.  She does not recall any swallowing difficulties or voice issues following that surgery.    Exam  Oral cavity oropharynx-free of mucosal lesion or inflammation  Neck-she has a well-healed scar low in her neck to the right of midline from her previous anterior fusion surgery on the right.  There is a palpable masses or adenopathy.  Nasal-he has puffy nasal membranes without purulence or polyps  Indirect laryngoscopy reveals neurologically intact larynx without mucosal lesion  Head and neck integument-Clear  General-the patient appears well and in no distress  Neuro-there are no focal cranial nerve deficits    Allergies    cyproheptadine Allergy (Unknown, Uncoded 11/30/23 08:23)  Unknown  methadone Allergy (Unknown, Uncoded 11/30/23 08:23)  Unknown  paroxetine Allergy (Unknown, Uncoded 11/30/23 08:23)  Unknown    A&P  Assessment & Plan  (1) Degenerative disc disease, cervical:        Status: Acute        Code(s):  M50.30 - Other cervical disc degeneration, unspecified cervical region  The patient was reassured that there is no evidence of previous neurologic injury to her larynx with her previous surgery.  She should be able to proceed with anterior cervical fusion as planned  with Dr. Long.               Venu Ruiz MD    Filed: 11/30/23 0823    <Electronically signed by Venu Ruiz MD> 11/30/23 0995

## 2024-01-06 ENCOUNTER — HEALTH MAINTENANCE LETTER (OUTPATIENT)
Age: 59
End: 2024-01-06

## 2024-02-15 ENCOUNTER — TRANSFERRED RECORDS (OUTPATIENT)
Dept: HEALTH INFORMATION MANAGEMENT | Facility: OTHER | Age: 59
End: 2024-02-15
Payer: MEDICARE

## 2024-02-21 ENCOUNTER — MEDICAL CORRESPONDENCE (OUTPATIENT)
Dept: HEALTH INFORMATION MANAGEMENT | Facility: OTHER | Age: 59
End: 2024-02-21
Payer: MEDICARE

## 2024-03-12 ENCOUNTER — OFFICE VISIT (OUTPATIENT)
Dept: OTOLARYNGOLOGY | Facility: OTHER | Age: 59
End: 2024-03-12
Attending: OTOLARYNGOLOGY
Payer: MEDICARE

## 2024-03-12 DIAGNOSIS — E04.1 NONTOXIC SINGLE THYROID NODULE: Primary | ICD-10-CM

## 2024-03-12 PROCEDURE — G0463 HOSPITAL OUTPT CLINIC VISIT: HCPCS

## 2024-03-12 NOTE — NURSING NOTE
Patient here to see ENT for consult for thyroid.   Madeleine Skinner LPN..........3/12/2024 11:40 AM

## 2024-03-14 ENCOUNTER — HOSPITAL ENCOUNTER (OUTPATIENT)
Dept: ULTRASOUND IMAGING | Facility: OTHER | Age: 59
Discharge: HOME | End: 2024-03-14
Attending: NURSE PRACTITIONER | Admitting: NURSE PRACTITIONER
Payer: MEDICARE

## 2024-03-14 VITALS
DIASTOLIC BLOOD PRESSURE: 76 MMHG | TEMPERATURE: 97.6 F | OXYGEN SATURATION: 97 % | SYSTOLIC BLOOD PRESSURE: 126 MMHG | HEART RATE: 76 BPM | RESPIRATION RATE: 16 BRPM

## 2024-03-14 DIAGNOSIS — E04.1 THYROID NODULE: ICD-10-CM

## 2024-03-14 PROCEDURE — 10005 FNA BX W/US GDN 1ST LES: CPT

## 2024-03-14 PROCEDURE — 250N000009 HC RX 250: Performed by: RADIOLOGY

## 2024-03-14 PROCEDURE — 88173 CYTOPATH EVAL FNA REPORT: CPT

## 2024-03-14 RX ADMIN — LIDOCAINE HYDROCHLORIDE 8 ML: 10 INJECTION, SOLUTION INFILTRATION; PERINEURAL at 13:16

## 2024-03-14 NOTE — PROGRESS NOTES
document embedded image  Patient Name: Danii Henderson   Address: [address] [place] [address]    YOB: 1965   [place] [id number]   MR Number: [id number]   Phone: [phone]  PCP: Barbara Trevizo CNP           Appointment Date: 03/12/24  Visit Provider: Venu Ruiz MD    cc: Barbara Trevizo CNP; ~    ENT Progress Note    Intake  Visit Reasons: Thyroid consult    HPI  History of Present Illness  Chief complaint:  Thyroid nodules     History  The patient is a 58-year-old female who was referred today for assistance with thyroid nodules.  She had a thyroid ultrasound obtained last month that revealed normal sides the lobes but bilateral thyroid nodules.  She had a 1.2 cm TR 4 lesion on the right and a 1.9 cm TR 4 lesion on the left.  Biopsy of the left thyroid nodule was recommended.  She tells me she has had normal thyroid functions.  She has no symptoms related to her thyroid.  She denies change in voice or significant swallowing issues.  She was significant degenerative spine disease and has spine surgery scheduled with Dr. Long through an anterior approach.  This surgery has been delayed to workup her thyroid.    Exam  Oral cavity oropharynx-free of mucosal lesion   Nasal-no obstruction or purulence   Indirect laryngoscopy reveals neurologically intact larynx without mucosal lesion   Neck-no palpable adenopathy.  I can not feel her thyroid nodules.    Head and neck integument-Clear  General-the patient appears well and in no distress   Neuro-there are no focal cranial nerve deficits    Allergies    cyproheptadine Allergy (Unknown, Uncoded 11/30/23 08:23)  Unknown  methadone Allergy (Unknown, Uncoded 11/30/23 08:23)  Unknown  paroxetine Allergy (Unknown, Uncoded 11/30/23 08:23)  Unknown    A&P  Assessment & Plan  (1) Thyroid nodule:         Status: Chronic        Code(s):  E04.1 - Nontoxic single thyroid nodule  The patient will need ultrasound-guided needle biopsy of her left thyroid  nodule.  She does not need to delay her anterior cervical surgery with Dr. Long.  I will get the ultrasound guided needle biopsy scheduled at her convenience.                Venu Ruiz MD    Filed: 03/13/24 1003     <Electronically signed by Venu Ruiz MD> 03/13/24 0451

## 2024-03-14 NOTE — PROGRESS NOTES
Total of 5 samples taken.  Cytology personnel in room: yes  Insertion complications: None  Patient tolerated the procedure:  yes  Bandaide applied:  yes

## 2024-03-14 NOTE — DISCHARGE INSTRUCTIONS
US GUIDED THYROID BIOPSY   A thyroid biopsy is a procedure in which a small sample of tissue is removed from the thyroid gland and looked at under a microscope for cancer, infection, or other thyroid problems. The thyroid gland is found in front of the windpipe (trachea), just below the voice box (larynx).       MEDICATIONS    Take your medications as directed by your doctor. If you have questions about your medications, please contact your doctor's office directly. If you are on a blood thinner, please follow these directions after your procedure:  ___________________________________________    DIET    You may resume your usual diet following this procedure.    ACTIVITY    Most patients can return to work the day of the procedure, however, avoid any vigorous physical activity for 24 hours.    COMFORT    It is not uncommon to be sore for a week or more after this procedure. If you have any discomfort of tenderness at the procedure site, you may take your usual or recommended pain medication. Avoid products containing aspirin the day of the procedure, unless approved by your doctor. In some circumstances, you may use ice on the procedure site. Your nurse will discuss this with you. You may feel most comfortable in a position where your head and neck are supported, such as in a recliner.     CARE OF SITE    Keep the bandage on for 24 hours. Then you may remove the bandage and shower. Do not submerge the area in a tub, pool, hot tub, or lake for 1 week following your procedure. You may experience a small amount of bleeding on the bandage. If you continue to have bleeding, apply gentle direct pressure to the biopsy site until the bleeding is stopped. Then, apply a new bandage. Bruising is not uncommon.     WHAT TO WATCH FOR    Anytime a procedure is done, there is a risk of infection. Call your doctor if you experience fever over 100.5 degrees, increased redness, swelling, persistent bleeding or drainage, foul  smelling drainage, or increased pain at the procedure site.    WHEN TO RETURN    Return to an emergency room if you experience any of the following:    Difficulty breathing or swallowing  Neck swelling  Bleeding that won't stop    BIOPSIES    If you had a biopsy, results take between 3-10 business days. Some tests take longer to result than others. Keep in mind that you may see your results before your doctor has viewed the results. Your doctor will contact you with results and to discuss next steps. If you don't hear from your doctor within 48 hours of results, you may call your doctor.     QUESTIONS, PROBLEMS, OR CONCERNS    Please contact your doctor directly or leave a message for a doctor at [facility] by calling [phone].    [facility] Imaging Nurse [phone]  Monday - Friday 8:00 am - 4:30 pm

## 2024-03-15 LAB — PATH REPORT.FINAL DX SPEC: NORMAL

## 2024-03-26 ENCOUNTER — TELEPHONE (OUTPATIENT)
Dept: ULTRASOUND IMAGING | Facility: OTHER | Age: 59
End: 2024-03-26
Payer: MEDICARE

## 2024-05-02 ENCOUNTER — HOSPITAL ENCOUNTER (OUTPATIENT)
Dept: MRI IMAGING | Facility: OTHER | Age: 59
Discharge: HOME | End: 2024-05-02
Admitting: RADIOLOGY
Payer: MEDICARE

## 2024-05-02 DIAGNOSIS — M54.2 CERVICALGIA: ICD-10-CM

## 2024-05-02 PROCEDURE — 72141 MRI NECK SPINE W/O DYE: CPT

## 2024-06-10 ENCOUNTER — TRANSFERRED RECORDS (OUTPATIENT)
Dept: HEALTH INFORMATION MANAGEMENT | Facility: OTHER | Age: 59
End: 2024-06-10
Payer: MEDICARE

## 2024-06-12 ENCOUNTER — MEDICAL CORRESPONDENCE (OUTPATIENT)
Dept: HEALTH INFORMATION MANAGEMENT | Facility: OTHER | Age: 59
End: 2024-06-12
Payer: MEDICARE

## 2024-06-17 NOTE — PROGRESS NOTES
Service Date: 2024    Eduardo Shaffer MD  [facility]  [address] [place] [address]     RE:  Danii Henderson   MRN:  [id number]   :  1965       Dear Dr. Shaffer,:    It was a pleasure participating in the care of your patient, Danii Henderson .  As you know, she is a 58 year old year old person who I met over a virtual visit today for blood pressure control, lipids, preop evaluation prior to elective surgery, abnormal EKG.    Past medical history is significant for the followin.  Type 2 diabetes  2.  Hypertension  3.  Hyperlipidemia  4.  Herniated disc, cervical region  5.  Low back pain  6.  Tobacco abuse  7.  Migraine headaches  8.  Anxiety/depression/PTSD/panic disorder  9.  Cataracts  10.  Restless leg syndrome  11.  Vitamin D deficiency  12.  Vitamin B-12 deficiency  13.  Carpal tunnel syndrome  14.  Thyroid nodule    Apparently the patient was seen in preop evaluation in [place] on 2024.  Workup revealed hypokalemia, as well as possible prolonged QT interval and the patient was referred here for further evaluation and treatment prior to approval for elective surgery.    From a clinical standpoint, the patient is able to walk about 4 blocks before having to stop secondary to pain in her hips and knees.  She does get short of breath and has experienced increasing exertional dyspnea over the past 2 to 3 months.  She also has some randomly occurring instantaneous chest discomfort when laying in a flat position but has not experienced this over the past month or so.    She denies gross dizziness lightheadedness or fainting or palpitations.    The patient otherwise denies gross chest pain, shortness of breath, PND, orthopnea, edema, palpitations, syncope or near syncope.    Cardiac risk factors: She does have a history of diabetes, positive for hypertension, she currently smokes and has smoked for over 40 years, trying to cut back currently at 5  cigarettes a day, positive family history of heart disease with her mother and father in their 60s, she does have hyperlipidemia    Social history: She is currently disabled from chronic pain, and enjoys gardening, walking, reading crime fiction    10 Point Review of Systems: Positive for grief as her nephew just committed suicide yesterday    MEDICATIONS:    1.  Semaglutide  2.  Gabapentin  3.  Lisinopril 10 mg twice daily  4.  Propranolol 120 mg a day  5.  Aspirin 81 mg a day  6.  Chlorthalidone 25 mg a day  7.  Potassium 20 mill colons a day  8.  Lipitor 10 mg a day  9.  Vitamin D  10.  Doxepin  11.  Lorazepam  12.  Venlafaxine    PHYSICAL EXAM:    6/13/2024: Blood pressure 120/75, weight 155 pounds  General:  Patient appears comfortable, well groomed  Psych:  Patient is alert and oriented X 3  Eyes:  No gross erythema, exudate  Respiratory:  Patient is breathing comfortably without gross cough    The remainder of the comprehensive physical exam was deferred secondary to the COVID-19 pandemic and secondary to virtual visit restrictions.    LABS:    6/20/2024: Potassium 2.9, GFR 65, hemoglobin A1c 9.0, triglycerides 316, HDL 37, LFTs normal, hemoglobin 15.3    CT abdomen 2/1/2022 reveals calcification of the aorta    IMPRESSION:    Danii is a 58-year-old lady whose past medical history significant for diabetes, hypertension, hyperlipidemia, tobacco abuse, anxiety/depression/PTSD/panic disorder who presents with multiple active issues:    1.  Blood pressure control    Blood pressures currently running 120/75 on her current regimen, continue to follow    2.  Lipids    Goal LDL less than 70 considering calcification of the aorta seen on abdominal CT in 2022, current LDL within goal range continue to follow    3.  Possible prolonged QT interval    This was reported on EKG at [facility], outside EKG not available for review.      4.  Preop evaluation prior to cervical spine surgery    The patient currently experiences  exertional dyspnea walking less than 4 blocks, which is increased over the past 2 to 3 months, along with atypical chest discomfort.  Further noninvasive evaluation would be indicated      PLAN:    1.  Echocardiogram and pharmacologic nuclear stress test in order to rule out significant inducible ischemia and/or structural pathology as a cause for symptoms.    2.  She already has had a pharmacy medical therapy management appointment last Friday, and her pharmacist will be contacting you with recommendations.  She will also be working with another pharmacist that she has chosen to ensure that her medicines are in order regarding potential interactions that might increase QT interval.    3.  She will be faxing over her EKG for me to review concerning the abnormalities that were remarked upon    Further updates to follow pending above test results    Once again, it was a pleasure participating in the care of your patient, Danii Henderson .  Please feel free to contact me at any time if there are any questions regarding her care in the future.      Sincerely,          Paul Spann M.D.  Cardiovascular Division  [facility]      Addendum 7/9/24:    Echo reveals normal LV systolic function without gross valvular pathology    Stress nuc results reveal no evidence for gross inducible ischemia    Plan:     Re-ordered urgent pharmacy MTM referral (within one week) for review and adjustment of meds to help avoid QT prolongation, await recommendations      Addendum 7/10/24:    Spoke with patient over the phone today, reviewed echo and stress nuclear results.    Entered all medications into drug interaction program through up-to-date and found multiple medication interactions that were contraindicated.    (Azelastine contraindicated with Ambien, Atarax, cetirizine, Neurontin, OxyContin, tizanidine, Tylenol)    (Ambien with Atarax, cetirizine, Neurontin, OxyContin, tizanidine, Tylenol)    (OxyContin with Atarax,  "Ambien, Atarax, cetirizine, Neurontin, tizanidine, Tylenol)    Many other relative and absolute contraindications were identified toonumerous to list    Plan:     The patient will be faxing in her EKG for review    2.  She will be meeting with a pharmacist for a formal evaluation of her medications in terms of relative and absolute contraindications of interactions, and also in relation to her QT interval    Once her medications have been optimized from the standpoints, and her EKG has been reviewed we can consider proceeding with her planned procedures.    The patient has been notified of following:     \"This video visit will be conducted via a call between you and your physician/provider. We have found that certain health care needs can be provided without the need for an in-person physical exam.  This service lets us provide the care you need with a video conversation.  If a prescription is necessary we can send it directly to your pharmacy.  If lab work is needed we can place an order for that and you can then stop by our lab to have the test done at a later time.    Video visits are billed at different rates depending on your insurance coverage.  Please reach out to your insurance provider with any questions.    If during the course of the call the physician/provider feels a video visit is not appropriate, you will not be charged for this service.\"    Patient has given verbal consent for video visit? Yes    How would you like to obtain your AVS? Mail    Video-Visit Details    Type of service:  Video Visit    Video Start Time:1155am    Video End Time:1215pm    Total visit time including video visit, chart review, charting, coordination of care =56min    Originating Location (pt. Location):patient home      Distant Location (provider location):   Off site home office    Platform used for Video Visit: Ish PATEL#:[id number]           "

## 2024-06-18 ENCOUNTER — VIRTUAL VISIT (OUTPATIENT)
Dept: CARDIOLOGY | Facility: OTHER | Age: 59
End: 2024-06-18
Attending: INTERNAL MEDICINE
Payer: MEDICARE

## 2024-06-18 VITALS — WEIGHT: 150 LBS | HEIGHT: 64 IN | BODY MASS INDEX: 25.61 KG/M2

## 2024-06-18 DIAGNOSIS — R06.09 DOE (DYSPNEA ON EXERTION): Primary | ICD-10-CM

## 2024-06-18 PROCEDURE — 99203 OFFICE O/P NEW LOW 30 MIN: CPT | Mod: 95 | Performed by: INTERNAL MEDICINE

## 2024-06-18 RX ORDER — MAGNESIUM OXIDE 400 MG/1
TABLET ORAL
COMMUNITY
Start: 2024-04-30

## 2024-06-18 RX ORDER — SEMAGLUTIDE 1.34 MG/ML
INJECTION, SOLUTION SUBCUTANEOUS
COMMUNITY
Start: 2024-04-20

## 2024-06-18 RX ORDER — VENLAFAXINE HYDROCHLORIDE 75 MG/1
CAPSULE, EXTENDED RELEASE ORAL
COMMUNITY
Start: 2024-05-29

## 2024-06-18 RX ORDER — DORZOLAMIDE HCL 20 MG/ML
SOLUTION/ DROPS OPHTHALMIC
COMMUNITY
Start: 2023-11-28

## 2024-06-18 RX ORDER — ATORVASTATIN CALCIUM 10 MG/1
TABLET, FILM COATED ORAL
COMMUNITY
Start: 2024-06-03

## 2024-06-18 RX ORDER — ONDANSETRON 4 MG/1
TABLET, ORALLY DISINTEGRATING ORAL
COMMUNITY

## 2024-06-18 RX ORDER — SENNOSIDES 8.6 MG
TABLET ORAL
COMMUNITY
Start: 2024-02-27

## 2024-06-18 RX ORDER — FLUTICASONE PROPIONATE 50 MCG
SPRAY, SUSPENSION (ML) NASAL
COMMUNITY
Start: 2023-09-14

## 2024-06-18 RX ORDER — CHLORTHALIDONE 25 MG/1
1 TABLET ORAL DAILY
COMMUNITY
Start: 2023-11-17

## 2024-06-18 RX ORDER — ASPIRIN 81 MG/1
TABLET, COATED ORAL
COMMUNITY
Start: 2024-05-17

## 2024-06-18 RX ORDER — MONTELUKAST SODIUM 10 MG/1
TABLET ORAL
COMMUNITY
Start: 2024-06-03

## 2024-06-18 RX ORDER — MELOXICAM 15 MG/1
TABLET ORAL
COMMUNITY
Start: 2024-04-09

## 2024-06-18 RX ORDER — POTASSIUM CHLORIDE 1500 MG/1
TABLET, EXTENDED RELEASE ORAL
COMMUNITY
Start: 2024-05-30

## 2024-06-18 RX ORDER — ACETAMINOPHEN 325 MG/1
TABLET ORAL
COMMUNITY
Start: 2024-03-29

## 2024-06-18 RX ORDER — AZELASTINE HYDROCHLORIDE 137 UG/1
2 SPRAY, METERED NASAL 2 TIMES DAILY
COMMUNITY
Start: 2023-12-27

## 2024-06-18 RX ORDER — CETIRIZINE HYDROCHLORIDE 10 MG/1
10 TABLET ORAL DAILY
COMMUNITY

## 2024-06-18 RX ORDER — PRAZOSIN HYDROCHLORIDE 1 MG/1
CAPSULE ORAL
COMMUNITY

## 2024-06-18 RX ORDER — ZOLPIDEM TARTRATE 10 MG/1
10 TABLET ORAL AT BEDTIME
COMMUNITY

## 2024-06-18 RX ORDER — OXYCODONE HYDROCHLORIDE 15 MG/1
TABLET ORAL
COMMUNITY

## 2024-06-18 RX ORDER — OXYCODONE HYDROCHLORIDE 10 MG/1
TABLET, FILM COATED, EXTENDED RELEASE ORAL
COMMUNITY

## 2024-06-18 ASSESSMENT — PAIN SCALES - GENERAL: PAINLEVEL: EXTREME PAIN (8)

## 2024-06-18 NOTE — PATIENT INSTRUCTIONS
1.  Echocardiogram and pharmacologic nuclear stress test in order to rule out significant inducible ischemia and/or structural pathology as a cause for symptoms.    2.  She already has had a pharmacy medical therapy management appointment last Friday, and her pharmacist will be contacting you with recommendations.  She will also be working with another pharmacist that she has chosen to ensure that her medicines are in order regarding potential interactions that might increase QT interval.    3.  She will be faxing over her EKG for me to review concerning the abnormalities that were remarked upon    Further updates to follow pending above test results

## 2024-06-18 NOTE — NURSING NOTE
Patient confirms medications and allergies are accurate via patients echeck in completion, and or denies any changes since last reviewed/verified.       Is the patient currently in the state of [place]? YES    Visit mode:VIDEO    If the visit is dropped, the patient can be reconnected by: VIDEO VISIT: Text to cell phone:   Telephone Information:   Mobile [phone]       Will anyone else be joining the visit? NO  (If patient encounters technical issues they should call [phone])    How would you like to obtain your AVS? MyChart    Are changes needed to the allergy or medication list? Yes -   PLEASE ADD TO MED LIST:  Novolog 10 unites TID  Oxycontin 4x daily  Levimir 38 Units  Diopin 10mg  Salicylate 15%    Are refills needed on medications prescribed by this physician? NO    Reason for visit: Consult    Rabia WAGONER

## 2024-06-26 ENCOUNTER — NURSE TRIAGE (OUTPATIENT)
Dept: FAMILY MEDICINE | Facility: OTHER | Age: 59
End: 2024-06-26
Payer: MEDICARE
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
## 2024-07-05 ENCOUNTER — TELEPHONE (OUTPATIENT)
Dept: CARDIOLOGY | Facility: OTHER | Age: 59
End: 2024-07-05
Payer: MEDICARE

## 2024-07-05 NOTE — TELEPHONE ENCOUNTER
Left message re:stress testing instructions  Emphasis on no caffeine for 112 hours prior to testing  Left call back number for questions or concerns([phone])

## 2024-07-08 ENCOUNTER — HOSPITAL ENCOUNTER (OUTPATIENT)
Dept: CARDIOLOGY | Facility: OTHER | Age: 59
Discharge: HOME | End: 2024-07-08
Attending: INTERNAL MEDICINE | Admitting: INTERNAL MEDICINE
Payer: MEDICARE

## 2024-07-08 DIAGNOSIS — R06.09 DOE (DYSPNEA ON EXERTION): ICD-10-CM

## 2024-07-08 LAB — LVEF ECHO: NORMAL

## 2024-07-08 PROCEDURE — 93306 TTE W/DOPPLER COMPLETE: CPT

## 2024-07-08 PROCEDURE — 93306 TTE W/DOPPLER COMPLETE: CPT | Mod: 26 | Performed by: INTERNAL MEDICINE

## 2024-07-09 ENCOUNTER — HOSPITAL ENCOUNTER (OUTPATIENT)
Dept: NUCLEAR MEDICINE | Facility: OTHER | Age: 59
Discharge: HOME | End: 2024-07-09
Attending: INTERNAL MEDICINE
Payer: MEDICARE

## 2024-07-09 DIAGNOSIS — R06.09 DOE (DYSPNEA ON EXERTION): ICD-10-CM

## 2024-07-09 DIAGNOSIS — R94.31 PROLONGED QT INTERVAL: Primary | ICD-10-CM

## 2024-07-09 LAB
CV BLOOD PRESSURE: 78 MMHG
CV STRESS MAX HR HE: 86
NUC STRESS EJECTION FRACTION: 83 %
RATE PRESSURE PRODUCT: NORMAL
STRESS ECHO BASELINE DIASTOLIC HE: 77
STRESS ECHO BASELINE HR: 72 BPM
STRESS ECHO BASELINE SYSTOLIC BP: 122
STRESS ECHO CALCULATED PERCENT HR: 54 %
STRESS ECHO LAST STRESS DIASTOLIC BP: 72
STRESS ECHO LAST STRESS SYSTOLIC BP: 133
STRESS ECHO POST ESTIMATED WORKLOAD: 1 METS
STRESS ECHO TARGET HR: 159

## 2024-07-09 PROCEDURE — 93018 CV STRESS TEST I&R ONLY: CPT | Performed by: STUDENT IN AN ORGANIZED HEALTH CARE EDUCATION/TRAINING PROGRAM

## 2024-07-09 PROCEDURE — 250N000011 HC RX IP 250 OP 636: Performed by: STUDENT IN AN ORGANIZED HEALTH CARE EDUCATION/TRAINING PROGRAM

## 2024-07-09 PROCEDURE — 78452 HT MUSCLE IMAGE SPECT MULT: CPT | Mod: ME

## 2024-07-09 PROCEDURE — 343N000001 HC RX 343: Performed by: INTERNAL MEDICINE

## 2024-07-09 PROCEDURE — 93017 CV STRESS TEST TRACING ONLY: CPT

## 2024-07-09 PROCEDURE — A9500 TC99M SESTAMIBI: HCPCS | Performed by: INTERNAL MEDICINE

## 2024-07-09 RX ORDER — AMINOPHYLLINE 25 MG/ML
50 INJECTION, SOLUTION INTRAVENOUS 2 TIMES DAILY PRN
Status: DISCONTINUED | OUTPATIENT
Start: 2024-07-09 | End: 2024-07-10 | Stop reason: HOSPADM

## 2024-07-09 RX ORDER — REGADENOSON 0.08 MG/ML
0.4 INJECTION, SOLUTION INTRAVENOUS ONCE
Status: COMPLETED | OUTPATIENT
Start: 2024-07-09 | End: 2024-07-09

## 2024-07-09 RX ORDER — AMINOPHYLLINE 25 MG/ML
50-200 INJECTION, SOLUTION INTRAVENOUS
Status: DISCONTINUED | OUTPATIENT
Start: 2024-07-09 | End: 2024-07-10 | Stop reason: HOSPADM

## 2024-07-09 RX ORDER — REGADENOSON 0.08 MG/ML
0.4 INJECTION, SOLUTION INTRAVENOUS ONCE
Status: DISCONTINUED | OUTPATIENT
Start: 2024-07-09 | End: 2024-07-10 | Stop reason: HOSPADM

## 2024-07-09 RX ORDER — INSULIN ASPART 100 [IU]/ML
10 INJECTION, SOLUTION INTRAVENOUS; SUBCUTANEOUS
COMMUNITY
Start: 2024-02-06

## 2024-07-09 RX ADMIN — REGADENOSON 0.4 MG: 0.08 INJECTION, SOLUTION INTRAVENOUS at 10:50

## 2024-07-09 RX ADMIN — KIT FOR THE PREPARATION OF TECHNETIUM TC99M SESTAMIBI 33 MILLICURIE: 1 INJECTION, POWDER, LYOPHILIZED, FOR SOLUTION PARENTERAL at 10:50

## 2024-07-09 RX ADMIN — KIT FOR THE PREPARATION OF TECHNETIUM TC99M SESTAMIBI 7.73 MILLICURIE: 1 INJECTION, POWDER, LYOPHILIZED, FOR SOLUTION PARENTERAL at 09:30

## 2024-07-09 NOTE — PROGRESS NOTES
0905: The patient arrived for a Lexiscan Cardiolite stress test.  The procedure, risks, and benefits were discussed with the patient ,and the consent was signed.  A saline lock was started,and the Cardiolite was injected by Nuclear Medicine.  The patient was taken to the waiting area, to await resting images at 0935.  10:20: The patient returned from Nuclear Medicine and was prepped for the stress test.   Alejandra Jade RN  arrived, and the patient was administered the Lexiscan per procedure.  The patient tolerated the procedure.  She was given a snack and taken to Nuclear Medicine in stable condition for stress images.  The saline lock will be removed by Nuclear Medicine for proper disposal.  The patient was instructed that the ordering MD will call with results in one to two days.  Please see the chart for the complete test results.

## 2024-07-10 ENCOUNTER — TELEPHONE (OUTPATIENT)
Dept: PHARMACY | Facility: OTHER | Age: 59
End: 2024-07-10
Payer: MEDICARE

## 2024-07-10 ENCOUNTER — DOCUMENTATION ONLY (OUTPATIENT)
Dept: CARDIOLOGY | Facility: CLINIC | Age: 59
End: 2024-07-10
Payer: MEDICARE

## 2024-12-07 ENCOUNTER — HOSPITAL ENCOUNTER (OUTPATIENT)
Dept: MRI IMAGING | Facility: OTHER | Age: 59
Discharge: HOME | End: 2024-12-07
Attending: STUDENT IN AN ORGANIZED HEALTH CARE EDUCATION/TRAINING PROGRAM | Admitting: STUDENT IN AN ORGANIZED HEALTH CARE EDUCATION/TRAINING PROGRAM
Payer: MEDICARE

## 2024-12-07 DIAGNOSIS — M54.12 CERVICAL RADICULOPATHY: ICD-10-CM

## 2024-12-07 PROCEDURE — 72141 MRI NECK SPINE W/O DYE: CPT

## 2025-01-25 ENCOUNTER — HEALTH MAINTENANCE LETTER (OUTPATIENT)
Age: 60
End: 2025-01-25

## 2025-02-05 ENCOUNTER — HOSPITAL ENCOUNTER (OUTPATIENT)
Dept: GENERAL RADIOLOGY | Facility: OTHER | Age: 60
Discharge: HOME | End: 2025-02-05
Attending: STUDENT IN AN ORGANIZED HEALTH CARE EDUCATION/TRAINING PROGRAM
Payer: MEDICARE

## 2025-02-05 ENCOUNTER — OFFICE VISIT (OUTPATIENT)
Dept: FAMILY MEDICINE | Facility: OTHER | Age: 60
End: 2025-02-05
Attending: STUDENT IN AN ORGANIZED HEALTH CARE EDUCATION/TRAINING PROGRAM
Payer: MEDICARE

## 2025-02-05 VITALS
HEIGHT: 63 IN | RESPIRATION RATE: 18 BRPM | SYSTOLIC BLOOD PRESSURE: 138 MMHG | WEIGHT: 153 LBS | OXYGEN SATURATION: 98 % | DIASTOLIC BLOOD PRESSURE: 76 MMHG | HEART RATE: 86 BPM | TEMPERATURE: 98.6 F | BODY MASS INDEX: 27.11 KG/M2

## 2025-02-05 DIAGNOSIS — M54.42 CHRONIC BILATERAL LOW BACK PAIN WITH BILATERAL SCIATICA: ICD-10-CM

## 2025-02-05 DIAGNOSIS — M25.551 HIP PAIN, RIGHT: ICD-10-CM

## 2025-02-05 DIAGNOSIS — M25.552 HIP PAIN, LEFT: Primary | ICD-10-CM

## 2025-02-05 DIAGNOSIS — M54.41 CHRONIC BILATERAL LOW BACK PAIN WITH BILATERAL SCIATICA: ICD-10-CM

## 2025-02-05 DIAGNOSIS — G89.29 CHRONIC BILATERAL LOW BACK PAIN WITH BILATERAL SCIATICA: ICD-10-CM

## 2025-02-05 DIAGNOSIS — M25.552 HIP PAIN, LEFT: ICD-10-CM

## 2025-02-05 PROCEDURE — 73522 X-RAY EXAM HIPS BI 3-4 VIEWS: CPT

## 2025-02-05 PROCEDURE — G0463 HOSPITAL OUTPT CLINIC VISIT: HCPCS

## 2025-02-05 PROCEDURE — 72100 X-RAY EXAM L-S SPINE 2/3 VWS: CPT

## 2025-02-05 RX ORDER — METHYLPREDNISOLONE 4 MG/1
TABLET ORAL
Qty: 21 TABLET | Refills: 0 | Status: SHIPPED | OUTPATIENT
Start: 2025-02-05 | End: 2025-02-05

## 2025-02-05 RX ORDER — METHYLPREDNISOLONE 4 MG/1
TABLET ORAL
Qty: 21 TABLET | Refills: 0 | Status: SHIPPED | OUTPATIENT
Start: 2025-02-05

## 2025-02-05 ASSESSMENT — ENCOUNTER SYMPTOMS: HIP PAIN: 1

## 2025-02-05 ASSESSMENT — PAIN SCALES - GENERAL: PAINLEVEL_OUTOF10: SEVERE PAIN (8)

## 2025-02-05 ASSESSMENT — PATIENT HEALTH QUESTIONNAIRE - PHQ9
SUM OF ALL RESPONSES TO PHQ QUESTIONS 1-9: 22
10. IF YOU CHECKED OFF ANY PROBLEMS, HOW DIFFICULT HAVE THESE PROBLEMS MADE IT FOR YOU TO DO YOUR WORK, TAKE CARE OF THINGS AT HOME, OR GET ALONG WITH OTHER PEOPLE: EXTREMELY DIFFICULT
SUM OF ALL RESPONSES TO PHQ QUESTIONS 1-9: 22

## 2025-02-05 NOTE — PROGRESS NOTES
"  Assessment & Plan   Problem List Items Addressed This Visit    None  Visit Diagnoses       Hip pain, left    -  Primary    Relevant Medications    methylPREDNISolone (MEDROL DOSEPAK) 4 MG tablet therapy pack    Other Relevant Orders    XR Pelvis and Hip Bilateral 2 Views (Completed)    Physical Therapy  Referral    Hip pain, right        Relevant Medications    methylPREDNISolone (MEDROL DOSEPAK) 4 MG tablet therapy pack    Other Relevant Orders    XR Pelvis and Hip Bilateral 2 Views (Completed)    Physical Therapy  Referral    Chronic bilateral low back pain with bilateral sciatica        Relevant Medications    methylPREDNISolone (MEDROL DOSEPAK) 4 MG tablet therapy pack    Other Relevant Orders    XR Lumbar Spine 2/3 Views (Completed)    Physical Therapy  Referral           Sounds like it could be sciatic pain. Could be muscle strain but odd presention to go down to feet.   Is on quite a bit of narcotics  Already takes gabapentin   Sounds like muscle relaxer was stopped due to med interaction though I don't see records from [facility] where her pcp is.  Trial medrol dosepak.   Xray negative for acute fracture  Referral to PT for pool therapy  Recommend follow up with her pcp        Nicotine/Tobacco Cessation  She reports that she has been smoking. She has been exposed to tobacco smoke. She does not have any smokeless tobacco history on file.  Nicotine/Tobacco Cessation Plan        BMI  Estimated body mass index is 27.11 kg/m  as calculated from the following:    Height as of this encounter: 1.6 m (5' 2.99\").    Weight as of this encounter: 69.4 kg (153 lb).       Depression Screening Follow Up        2/5/2025     1:50 PM   PHQ   PHQ-9 Total Score 22    Q9: Thoughts of better off dead/self-harm past 2 weeks Not at all       Patient-reported         2/5/2025     1:50 PM   Last PHQ-9   1.  Little interest or pleasure in doing things 3   2.  Feeling down, depressed, or hopeless 3   3.  Trouble " falling or staying asleep, or sleeping too much 3   4.  Feeling tired or having little energy 3   5.  Poor appetite or overeating 3   6.  Feeling bad about yourself 2   7.  Trouble concentrating 3   8.  Moving slowly or restless 2   Q9: Thoughts of better off dead/self-harm past 2 weeks 0   PHQ-9 Total Score 22        Patient-reported     Recommend follow up with PCP       Michael   Danii is a 59 year old, presenting for the following health issues:  Hip Pain (Left )      2/5/2025     2:15 PM   Additional Questions   Roomed by Shanique shearer     Hip Pain    History of Present Illness       Back Pain:  She presents for follow up of back pain. Patient's back pain is a chronic problem.  Location of back pain:  Right lower back, left lower back, right buttock, left buttock, right hip, left hip, right side of waist and left side of waist  Description of back pain: fullness, sharp, shooting and stabbing  Back pain spreads: right buttocks, left buttocks, right thigh, left thigh, left knee and left foot    Since patient first noticed back pain, pain is: rapidly worsening  Does back pain interfere with her job:  Yes       Reason for visit:  Pain down both legs and back    She eats 4 or more servings of fruits and vegetables daily.She consumes 2 sweetened beverage(s) daily.She exercises with enough effort to increase her heart rate 9 or less minutes per day.  She exercises with enough effort to increase her heart rate 3 or less days per week.   She is taking medications regularly.     Right hip/low pack pain   - started 2 weeks ago   - trying hot and cold packs, ibuprofen   - for chronic pain in spine and back - taking OxyContin and oxycodone  - hard to sit long, different than normal pain   - feels in tailbone  - no changes or trauma to area  - pain radiates from low back through buttocks to feet                  Review of Systems  Constitutional, HEENT, cardiovascular, pulmonary, gi and gu systems are negative, except as  "otherwise noted.      Objective    /76   Pulse 86   Temp 98.6  F (37  C) (Tympanic)   Resp 18   Ht 1.6 m (5' 2.99\")   Wt 69.4 kg (153 lb)   SpO2 98%   Breastfeeding No   BMI 27.11 kg/m    Body mass index is 27.11 kg/m .  Physical Exam   GENERAL: alert and no distress  MS: normal hips ROM extension and flexion.   SKIN: no suspicious lesions or rashes  Comprehensive back pain exam:  Tenderness of midline lumbar spinous process, Range of motion not limited by pain, Lower extremity strength functional and equal on both sides, and Lower extremity sensation normal and equal on both sides  PSYCH: mentation appears normal, affect normal/bright    XR Pelvis and Hip Bilateral 2 Views    Result Date: 2/5/2025  PROCEDURE: XR PELVIS AND HIP BILATERAL 2 VIEWS 2/5/2025 3:09 PM HISTORY: acute hip/groin pain, worse with walking. no trauma or falls. known chronic low back pain; Hip pain, left; Hip pain, right COMPARISONS: None. TECHNIQUE: Pelvis one view, right hip 2 views, left hip 2 views FINDINGS: Pelvis: The pelvis is intact. The sacrum and sacroiliac joints appear normal. Right hip: The articular spaces are normal in height. The acetabula and proximal femur appears normal. Left hip 2 views: The articular space is normal in height. The acetabulum and proximal femur appears normal.        IMPRESSION: Normal pelvis and hips bilaterally TANIA ILN MD   SYSTEM ID:  [id number]    XR Lumbar Spine 2/3 Views    Result Date: 2/5/2025  PROCEDURE: XR LUMBAR SPINE 2/3 VIEWS 2/5/2025 3:08 PM HISTORY: chronic low back pain, flare with sciatic symptoms down L>R leg the last few weeks (sciatic pain new); Chronic bilateral low back pain with bilateral sciatica; Chronic bilateral low back pain with bilateral sciatica; Chronic bilateral low back pain with bilateral sciatica COMPARISONS: None. TECHNIQUE: AP and lateral FINDINGS: Lumbar discs are normal in height. There are facet joint degenerative changes noted at L4-L5 and L5-S1. " Mild anterior and lateral osteophytes are seen at L1-L2 L2-L3 and L4-L5. The sacrum and sacroiliac joints are normal. Atherosclerotic calcifications are seen in the abdominal aorta.        IMPRESSION: Mild degenerative changes of the lumbar spine TANIA LIN MD   SYSTEM ID:  [id number]         Signed Electronically by: Kim Delgado MD

## 2025-02-05 NOTE — NURSING NOTE
Patient is here for low back and left hip pain. States having difficulty getting out of bed and doing things, pain is going down into her ankle. States her OxyContin and Roxicodone are not helping. States was at [facility] for same thing about 2 weeks ago.     No LMP recorded. Patient is postmenopausal.  Medication Reconciliation: complete    Shanique Nixon LPN 2/5/2025 2:22 PM       Advance care directive on file? no  Advance care directive provided to patient? no       Shanique Nixon LPN

## 2025-02-06 ENCOUNTER — MYC MEDICAL ADVICE (OUTPATIENT)
Dept: CARDIOLOGY | Facility: OTHER | Age: 60
End: 2025-02-06
Payer: MEDICARE

## 2025-02-10 ENCOUNTER — TELEPHONE (OUTPATIENT)
Dept: CARDIOLOGY | Facility: CLINIC | Age: 60
End: 2025-02-10
Payer: MEDICARE

## 2025-02-10 NOTE — TELEPHONE ENCOUNTER
Spoke to patient and she states she was just looking to get her MTM set up but she has it set up now.  No further action at this point.  She is going to get her EKG done at [facility] and I gave her our cardiology fax number to send the results to.  Janina Mishra RN......February 10, 2025...3:08 PM

## 2025-02-10 NOTE — TELEPHONE ENCOUNTER
"Per Dr. Spann: \"  Hi Ramila,    Please see progress note addendum    Thanks!   \"  Addendum 2/7/25  Plan:     1.  Patient requires formal pharmacy MTM referral and recommendations regarding comprehensive evaluation of her medical regimen from the standpoint of relative and absolute contraindications of drug interactions in relation to her QT interval, and making specific recommendations related to shortening her QT interval overall in order to prevent arrhythmias.     2.  Once this is completed, then another virtual follow-up appointment with an EKG prior, available for review to check clinical status and QT interval would be required for approval    Janina Mishra RN......February 10, 2025...11:47 AM   "

## 2025-02-12 ENCOUNTER — MYC MEDICAL ADVICE (OUTPATIENT)
Facility: CLINIC | Age: 60
End: 2025-02-12
Payer: MEDICARE

## 2025-02-13 NOTE — TELEPHONE ENCOUNTER
"Per Harjit Patel [facility]: \"  Delbert Roa -routing this back to you as an FYI, patient was scheduled with me for a consult to review medications for QT prolongation properties.  She was unavailable at the initial scheduled time, uncreachable at a second scheduled time for call.  Please let me know if you have any questions.  Thank you,  Jose E   \"    Routing to Dr. Spann as FYI.  Janina Mishra RN......February 13, 2025...2:56 PM   "

## 2025-03-04 ENCOUNTER — TELEPHONE (OUTPATIENT)
Dept: CARDIOLOGY | Facility: OTHER | Age: 60
End: 2025-03-04
Payer: MEDICARE

## 2025-03-04 NOTE — TELEPHONE ENCOUNTER
"Per Dr. Spann: \"Please see last progress note addendum\"    Addendum 3/4/2025:     Pharmacy MTM visit completed with Jose E Patel 2/28/2025     Zeferino Spann and team,     I met with Danii to review her medications for potentially contributing agents to previously noted prolonged QT interval prior to clearance for surgery.  Of note, she has a recent EKG on February 12 with a QT interval reported normal in comparison to previous reading.     That said, she is taking a small handful of medications capable of contributing to prolonged QT segment.  If needed, we did discuss potential elimination of hydroxyzine and potential dose reduction of doxepin.  Opiates are capable of contributing but are challenging to modify.     Please let me know if you have any questions.  I do not ask her to change anything at this time.  Appreciate your time and guidance.  Thank you,   Jose E         Plan:     1.  Please schedule virtual follow-up visit within the next few weeks in order to confirm clinical progress and hopefully finalize approval from a preoperative surgical standpoint          Left message to call back.   Janina Mishra RN......March 4, 2025...3:49 PM   "

## 2025-03-05 ENCOUNTER — THERAPY VISIT (OUTPATIENT)
Dept: PHYSICAL THERAPY | Facility: OTHER | Age: 60
End: 2025-03-05
Attending: STUDENT IN AN ORGANIZED HEALTH CARE EDUCATION/TRAINING PROGRAM
Payer: MEDICARE

## 2025-03-05 DIAGNOSIS — M54.42 CHRONIC BILATERAL LOW BACK PAIN WITH BILATERAL SCIATICA: ICD-10-CM

## 2025-03-05 DIAGNOSIS — G89.29 CHRONIC BILATERAL LOW BACK PAIN WITH BILATERAL SCIATICA: ICD-10-CM

## 2025-03-05 DIAGNOSIS — M25.551 HIP PAIN, RIGHT: ICD-10-CM

## 2025-03-05 DIAGNOSIS — M25.552 HIP PAIN, LEFT: ICD-10-CM

## 2025-03-05 DIAGNOSIS — M54.41 CHRONIC BILATERAL LOW BACK PAIN WITH BILATERAL SCIATICA: ICD-10-CM

## 2025-03-05 PROCEDURE — 97110 THERAPEUTIC EXERCISES: CPT | Mod: GP

## 2025-03-05 PROCEDURE — 97530 THERAPEUTIC ACTIVITIES: CPT | Mod: GP

## 2025-03-05 PROCEDURE — 97161 PT EVAL LOW COMPLEX 20 MIN: CPT | Mod: GP

## 2025-03-05 NOTE — PROGRESS NOTES
PHYSICAL THERAPY EVALUATION  Type of Visit: Evaluation       Fall Risk Screen:  Fall screen completed by: PT  Have you fallen 2 or more times in the past year?: No  Have you fallen and had an injury in the past year?: No  Is patient a fall risk?: No    Subjective         Presenting condition or subjective complaint: Two months ago L side started to get really uncomfortable, has been getting referring pain down both hips but L is more impacted. Has had such bad bouts of pain she had to crawl to the bathroom when pain was at its worst. On multiple medications for chronic pain as well as gabapentin. Pt describes pain, trouble putting weight on her feet, stiffness, trouble with balance, trouble with walking, buring, numbness, tingling, headaches, and dizziness. Pain described as average of 5/10, worst of 10/10, described as sharp, dull, aching, burning, shooting, stabbing, all the time, worsens with sitting, walking, carrying, certain positions, reaching overhead, bending, daily self-care tasks, household tasks, improves with chaning positions, heat, cold. Health history of broken ankles, cervical fusion C5-7, depression, diabetes, HBP, arthritis, smoking.  Date of onset: 01/05/25       Prior diagnostic imaging/testing results:     X-rays  Prior therapy history for the same diagnosis, illness or injury:    Denies    Prior Level of Function  Transfers: Independent  Ambulation: Independent  ADL: Independent  Employment:    Disabled  Patient goals for therapy:  Improve walking, daily comfort with activity  Pain assessment: Location: L/S spine and B referring hip pain /Rating: 10/10     Objective   Lumbar AROM FF to toes, ext to 10 deg very painful, B rot 50%, B SB 10 deg both very painful  Hip PROM WFL all     Strength  R hip abd 4/5  L hip Ext 4-/5   B pir very weak compared to other hip rotators   Core - poor awareness    Palpation  Sig ttp B pir with perfect replication of pt symptoms  Mod glute med/max, deep hip  rotators    Special tests:   - SIJ cluster   - Lumbar screen for myotome, dermatome  + Peroneal n tension B  - Long axis traction test for symptom reduction  - NATALIA, FADDIR, hip cluster B     Observation  Generalized intolerance to walking without symptoms  Posture - heavy APT in stance, poor tolerance to PPT without heavy cues    Assessment & Plan   CLINICAL IMPRESSIONS  Medical Diagnosis: M25.551, M25.552, M54.42    Treatment Diagnosis: Limited ROM, weakness, high tone through B L/S multifidi and piriformis, glute max/med B   Impression/Assessment: Patient is a 59 year old female with LBP/SIJ and B hip pain complaints.  The following significant findings have been identified: Pain, Decreased ROM/flexibility, Decreased joint mobility, Decreased strength, Impaired balance, Decreased proprioception, Impaired gait, Impaired muscle performance, Decreased activity tolerance, and Impaired posture. These impairments interfere with their ability to perform self care tasks, work tasks, recreational activities, household chores, driving , household mobility, and community mobility as compared to previous level of function.     Clinical Decision Making (Complexity):  Clinical Presentation: Stable/Uncomplicated  Clinical Presentation Rationale: based on medical and personal factors listed in PT evaluation  Clinical Decision Making (Complexity): Low complexity    PLAN OF CARE  Treatment Interventions:  Modalities: Biofeedback, Contrast Bath, Cryotherapy, Cupping, Dry Needling, Fluidotherapy, E-stim, Hot Pack, Iontophoresis, Mechanical Traction, Ultrasound  Interventions: Gait Training, Manual Therapy, Neuromuscular Re-education, Therapeutic Activity, Therapeutic Exercise, Self-Care/Home Management, Aquatic Therapy    Long Term Goals     PT Goal 1  Goal Identifier: ROM  Goal Description: Pt will be able to tolerate complete lumbar ROM without worsening symptoms through L/S spine and hips to improve QoL  Rationale: to maximize  safety and independence with performance of ADLs and functional tasks;to maximize safety and independence with self cares;to maximize safety and independence within the community  Target Date: 04/16/25  PT Goal 2  Goal Identifier: ADLs  Goal Description: Pt will be able to tolerate all ADLs and self cares without pain or compensation to improve QoL  Rationale: to maximize safety and independence with performance of ADLs and functional tasks;to maximize safety and independence within the community;to maximize safety and independence with self cares  Target Date: 04/30/25  PT Goal 3  Goal Identifier: Walking  Goal Description: Pt will be able to ambulate at self selected pace for at least 20 minutes without increasing pain or compensation to improve community ambulation and QoL  Rationale: to maximize safety and independence with performance of ADLs and functional tasks;to maximize safety and independence within the community;to maximize safety and independence with self cares  Target Date: 04/30/25  PT Goal 4  Goal Identifier: HEP  Goal Description: Pt will be I with HEP and knowledge of progressions without pain or compensation to improve long term outcome  Rationale: to maximize safety and independence with performance of ADLs and functional tasks;to maximize safety and independence within the community;to maximize safety and independence with self cares  Target Date: 04/30/25      Frequency of Treatment: 1-2x per week  Duration of Treatment: up to 8 weeks    Recommended Referrals to Other Professionals:  None  Education Assessment:   Learner/Method: Patient;Listening;Reading;Demonstration;Pictures/Video  Education Comments: HEP, POC    Risks and benefits of evaluation/treatment have been explained.   Patient/Family/caregiver agrees with Plan of Care.     Evaluation Time:     PT Eval, Low Complexity Minutes (16647): 15     Signing Clinician: Bob Montenegro PT        [facility]                                                                                    OUTPATIENT PHYSICAL THERAPY      PLAN OF TREATMENT FOR OUTPATIENT REHABILITATION   Patient's Last Name, First Name, Danii Salomon YOB: 1965   Provider's Name   [facility]   Medical Record No.  [id number]     Onset Date: 01/05/25  Start of Care Date: 03/05/25     Medical Diagnosis:  M25.551, M25.552, M54.42      PT Treatment Diagnosis:  Limited ROM, weakness, high tone through B L/S multifidi and piriformis, glute max/med B Plan of Treatment  Frequency/Duration: 1-2x per week/ up to 8 weeks    Certification date from 03/05/25 to 04/30/25         See note for plan of treatment details and functional goals     Bob Montenegro, PT                         I CERTIFY THE NEED FOR THESE SERVICES FURNISHED UNDER        THIS PLAN OF TREATMENT AND WHILE UNDER MY CARE     (Physician attestation of this document indicates review and certification of the therapy plan).              Referring Provider:  Kim Delgado    Initial Assessment  See Epic Evaluation- Start of Care Date: 03/05/25

## 2025-03-05 NOTE — TELEPHONE ENCOUNTER
Patient notified of below information and verbalized understanding.  She was transferred to scheduling to make a video appointment with Dr. Spann.  Janina Mishra RN......March 5, 2025...9:53 AM

## 2025-03-06 NOTE — PROGRESS NOTES
Service Date: 3/11/2025    Barbara Trevizo NP  [facility]  [address] [place] [address]     RE:  Danii Henderson   MRN:  [id number]   :  1965       Dear Ms. Rucker:    It was a pleasure participating in the care of your patient, Danii Henderson .  As you know, she is a 59 year old year old person who I met over a virtual visit today for blood pressure control, lipids, preop evaluation prior to elective surgery, abnormal EKG .    Past medical history is significant for the followin.  Type 2 diabetes  2.  Hypertension  3.  Hyperlipidemia  4.  Herniated disc, cervical region  5.  Low back pain  6.  Tobacco abuse  7.  Migraine headaches  8.  Anxiety/depression/PTSD/panic disorder  9.  Cataracts  10.  Restless leg syndrome  11.  Vitamin D deficiency  12.  Vitamin B-12 deficiency  13.  Carpal tunnel syndrome  14.  Thyroid nodule     Apparently the patient was seen in preop evaluation in [place] on 2024.  Workup revealed hypokalemia, as well as possible prolonged QT interval and the patient was referred here for further evaluation and treatment prior to approval for elective surgery.    I last saw the patient 2024, and at that time we ordered an echocardiogram, pharmacologic nuclear stress test, and a pharmacy MTM appointment to ensure that medicines were optimized in terms of potential effects on QT interval.    Echocardiogram 2024 revealed normal LV systolic function without gross valvular pathology.  Pharmacologic nuclear stress test 2024 revealed no evidence for stress-induced ischemia.  Pharmacy MTM appointment  Jose E Jorge did not recommend any medicine changes at that time on 2025.    The patient presents today to review clinical progress.    From a clinical standpoint, the patient's can only walk about 15 steps and has to stop secondary to sciatica.  She says that her neck pain is getting slightly worse and that is affecting her walking as well.  She  is engaging in physical therapy and is awaiting approval for her procedure.  She otherwise feels fine and is looking forward to her elective surgery.  She has no other complaints.      The patient otherwise denies gross chest pain, shortness of breath, PND, orthopnea, edema, palpitations, syncope or near syncope.      MEDICATIONS:    1.  Semaglutide  2.  Gabapentin  3.  Lisinopril 10 mg twice daily  4.  Propranolol 120 mg a day  5.  Aspirin 81 mg a day  6.  Chlorthalidone 25 mg a day  7.  Potassium 20 mill colons a day  8.  Lipitor 10 mg a day  9.  Vitamin D  10.  Doxepin  11.  Lorazepam  12.  Venlafaxine    PHYSICAL EXAM:    Blood pressures currently running 130s systolic  General:  Patient is alert and oriented X 3  Respiratory:  Patient is breathing comfortably without gross cough    The remainder of the comprehensive physical exam was deferred secondary to the COVID-19 pandemic and secondary to virtual visit restrictions.    LABS:    6/20/2024: Potassium 2.9, GFR 65, hemoglobin A1c 9.0, triglycerides 316, HDL 37, LFTs normal, hemoglobin 15.3     CT abdomen 2/1/2022 reveals calcification of the aorta    Echocardiogram 7/8/2024 revealed normal LV systolic function without gross valvular pathology.      Pharmacologic nuclear stress test 7/9/2024 revealed no evidence for stress-induced ischemia.      EKG 2/12/2025 reveals normal sinus rhythm at a rate of 83 bpm, no gross ST changes, QT interval not grossly prolonged    IMPRESSION:    Danii is a 59-year-old lady whose past medical history significant for diabetes, hypertension, hyperlipidemia, tobacco abuse, anxiety/depression/PTSD/panic disorder who presents with multiple active issues:     1.  Blood pressure control     Blood pressures currently running low 130s, she will be gathering further information regarding this     2.  Lipids     Goal LDL less than 70 considering calcification of the aorta seen on abdominal CT in 2022, current LDL within goal range  continue to follow     3.  QT interval     EKG 2/12/2025 reveals normal sinus rhythm at a rate of 83 bpm, no gross ST changes, QT interval not grossly prolonged    Pharmacy MTM appointment 2/28/2025 did not recommend any significant changes in medical therapy regarding drug interactions or effects on QT interval      4.  Preop evaluation prior to cervical spine surgery    Echocardiogram 7/8/2024 revealed normal LV systolic function without gross valvular pathology.      Pharmacologic nuclear stress test 7/9/2024 revealed no evidence for stress-induced ischemia.      From a clinical standpoint, she is asymptomatic at a low level of exertion, mainly limited by sciatica and neck pain.  According to ACC/AHA guidelines, no additional workup or treatment would currently be indicated to further decrease her perioperative mortality.       PLAN:    1.  She is approved for her procedure at acceptable perioperative risk for event    2.  Close follow-up with you and her other consulting specialists    Once again, it was a pleasure participating in the care of your patient, Danii Henderson .  Please feel free to contact me at any time if there are any questions regarding her care in the future.      Sincerely,          Paul Spann M.D.  Cardiovascular Division  [facility]      I spent 35 minutes on the date of encounter of which 14 minutes spent in medical discussion.

## 2025-03-10 ENCOUNTER — TELEPHONE (OUTPATIENT)
Dept: CARDIOLOGY | Facility: CLINIC | Age: 60
End: 2025-03-10
Payer: MEDICARE

## 2025-03-10 NOTE — TELEPHONE ENCOUNTER
The patient requests a call back regarding several questions regarding her missed cardiology appointment today.    Okay to leave detailed message.      Antonieta Hearn on 3/10/2025 at 11:40 AM

## 2025-03-10 NOTE — TELEPHONE ENCOUNTER
Patient notified that her appointment is tomorrow with Dr. Spann and not today.  She was very relieved as she thought she missed her appointment for today with the time change.  Janina Mishra RN......March 10, 2025...11:59 AM

## 2025-03-11 ENCOUNTER — VIRTUAL VISIT (OUTPATIENT)
Dept: CARDIOLOGY | Facility: OTHER | Age: 60
End: 2025-03-11
Attending: INTERNAL MEDICINE
Payer: MEDICARE

## 2025-03-11 VITALS — HEIGHT: 63 IN | BODY MASS INDEX: 26.58 KG/M2 | WEIGHT: 150 LBS

## 2025-03-11 DIAGNOSIS — E78.5 DYSLIPIDEMIA: Primary | ICD-10-CM

## 2025-03-11 ASSESSMENT — PAIN SCALES - GENERAL: PAINLEVEL_OUTOF10: SEVERE PAIN (9)

## 2025-03-11 ASSESSMENT — PATIENT HEALTH QUESTIONNAIRE - PHQ9: SUM OF ALL RESPONSES TO PHQ QUESTIONS 1-9: 7

## 2025-03-25 ENCOUNTER — ANESTHESIA EVENT (OUTPATIENT)
Dept: SURGERY | Facility: OTHER | Age: 60
End: 2025-03-25
Payer: MEDICARE

## 2025-03-25 ENCOUNTER — ANESTHESIA (OUTPATIENT)
Dept: SURGERY | Facility: OTHER | Age: 60
End: 2025-03-25
Payer: MEDICARE

## 2025-03-25 ENCOUNTER — HOSPITAL ENCOUNTER (OUTPATIENT)
Facility: OTHER | Age: 60
Discharge: HOME | End: 2025-03-25
Attending: SURGERY | Admitting: SURGERY
Payer: MEDICARE

## 2025-03-25 VITALS
DIASTOLIC BLOOD PRESSURE: 68 MMHG | BODY MASS INDEX: 26.58 KG/M2 | TEMPERATURE: 97.2 F | HEIGHT: 63 IN | OXYGEN SATURATION: 100 % | WEIGHT: 150 LBS | HEART RATE: 86 BPM | SYSTOLIC BLOOD PRESSURE: 106 MMHG

## 2025-03-25 LAB — GLUCOSE BLDC GLUCOMTR-MCNC: 138 MG/DL (ref 70–99)

## 2025-03-25 PROCEDURE — 250N000011 HC RX IP 250 OP 636: Mod: JZ | Performed by: NURSE ANESTHETIST, CERTIFIED REGISTERED

## 2025-03-25 PROCEDURE — 45378 DIAGNOSTIC COLONOSCOPY: CPT | Performed by: SURGERY

## 2025-03-25 PROCEDURE — 82962 GLUCOSE BLOOD TEST: CPT

## 2025-03-25 PROCEDURE — 999N000010 HC STATISTIC ANES STAT CODE-CRNA PER MINUTE: Performed by: SURGERY

## 2025-03-25 PROCEDURE — 258N000003 HC RX IP 258 OP 636: Performed by: NURSE ANESTHETIST, CERTIFIED REGISTERED

## 2025-03-25 PROCEDURE — G0105 COLORECTAL SCRN; HI RISK IND: HCPCS | Performed by: SURGERY

## 2025-03-25 PROCEDURE — 250N000009 HC RX 250: Performed by: NURSE ANESTHETIST, CERTIFIED REGISTERED

## 2025-03-25 PROCEDURE — 250N000011 HC RX IP 250 OP 636: Performed by: NURSE ANESTHETIST, CERTIFIED REGISTERED

## 2025-03-25 PROCEDURE — 258N000003 HC RX IP 258 OP 636: Performed by: SURGERY

## 2025-03-25 RX ORDER — SODIUM CHLORIDE, SODIUM LACTATE, POTASSIUM CHLORIDE, CALCIUM CHLORIDE 600; 310; 30; 20 MG/100ML; MG/100ML; MG/100ML; MG/100ML
INJECTION, SOLUTION INTRAVENOUS CONTINUOUS
Status: DISCONTINUED | OUTPATIENT
Start: 2025-03-25 | End: 2025-03-25 | Stop reason: HOSPADM

## 2025-03-25 RX ORDER — PROPOFOL 10 MG/ML
INJECTION, EMULSION INTRAVENOUS PRN
Status: DISCONTINUED | OUTPATIENT
Start: 2025-03-25 | End: 2025-03-25

## 2025-03-25 RX ORDER — FLUMAZENIL 0.1 MG/ML
0.2 INJECTION, SOLUTION INTRAVENOUS
Status: DISCONTINUED | OUTPATIENT
Start: 2025-03-25 | End: 2025-03-25 | Stop reason: HOSPADM

## 2025-03-25 RX ORDER — NALOXONE HYDROCHLORIDE 0.4 MG/ML
0.2 INJECTION, SOLUTION INTRAMUSCULAR; INTRAVENOUS; SUBCUTANEOUS
Status: DISCONTINUED | OUTPATIENT
Start: 2025-03-25 | End: 2025-03-25 | Stop reason: HOSPADM

## 2025-03-25 RX ORDER — NALOXONE HYDROCHLORIDE 0.4 MG/ML
0.4 INJECTION, SOLUTION INTRAMUSCULAR; INTRAVENOUS; SUBCUTANEOUS
Status: DISCONTINUED | OUTPATIENT
Start: 2025-03-25 | End: 2025-03-25 | Stop reason: HOSPADM

## 2025-03-25 RX ORDER — HYDROMORPHONE HYDROCHLORIDE 1 MG/ML
0.5 INJECTION, SOLUTION INTRAMUSCULAR; INTRAVENOUS; SUBCUTANEOUS
Status: DISCONTINUED | OUTPATIENT
Start: 2025-03-25 | End: 2025-03-25 | Stop reason: HOSPADM

## 2025-03-25 RX ORDER — LIDOCAINE 40 MG/G
CREAM TOPICAL
Status: DISCONTINUED | OUTPATIENT
Start: 2025-03-25 | End: 2025-03-25 | Stop reason: HOSPADM

## 2025-03-25 RX ORDER — LIDOCAINE HYDROCHLORIDE 20 MG/ML
INJECTION, SOLUTION INFILTRATION; PERINEURAL PRN
Status: DISCONTINUED | OUTPATIENT
Start: 2025-03-25 | End: 2025-03-25

## 2025-03-25 RX ORDER — PROPOFOL 10 MG/ML
INJECTION, EMULSION INTRAVENOUS CONTINUOUS PRN
Status: DISCONTINUED | OUTPATIENT
Start: 2025-03-25 | End: 2025-03-25

## 2025-03-25 RX ADMIN — PHENYLEPHRINE HYDROCHLORIDE 200 MCG: 10 INJECTION INTRAVENOUS at 09:01

## 2025-03-25 RX ADMIN — PROPOFOL 150 MCG/KG/MIN: 10 INJECTION, EMULSION INTRAVENOUS at 08:53

## 2025-03-25 RX ADMIN — HYDROMORPHONE HYDROCHLORIDE 0.5 MG: 1 INJECTION, SOLUTION INTRAMUSCULAR; INTRAVENOUS; SUBCUTANEOUS at 09:33

## 2025-03-25 RX ADMIN — PROPOFOL 120 MG: 10 INJECTION, EMULSION INTRAVENOUS at 08:53

## 2025-03-25 RX ADMIN — SODIUM CHLORIDE, SODIUM LACTATE, POTASSIUM CHLORIDE, AND CALCIUM CHLORIDE 30 ML/HR: .6; .31; .03; .02 INJECTION, SOLUTION INTRAVENOUS at 08:24

## 2025-03-25 RX ADMIN — PHENYLEPHRINE HYDROCHLORIDE 200 MCG: 10 INJECTION INTRAVENOUS at 09:14

## 2025-03-25 RX ADMIN — LIDOCAINE HYDROCHLORIDE 40 MG: 20 INJECTION, SOLUTION INFILTRATION; PERINEURAL at 08:53

## 2025-03-25 RX ADMIN — PHENYLEPHRINE HYDROCHLORIDE 200 MCG: 10 INJECTION INTRAVENOUS at 09:11

## 2025-03-25 ASSESSMENT — ACTIVITIES OF DAILY LIVING (ADL)
ADLS_ACUITY_SCORE: 41

## 2025-03-25 ASSESSMENT — LIFESTYLE VARIABLES: TOBACCO_USE: 1

## 2025-03-25 NOTE — OR NURSING
Pt tolerated food and drink without nausea. Neck pain is much improved after 1 time dose of Dilaudid. Some cramping gas pain but is passing gas and is tolerable. AVS reviewed with pt and . Ambulated out.

## 2025-03-25 NOTE — ANESTHESIA CARE TRANSFER NOTE
Patient: Danii Henderson    Procedure: Procedure(s):  Colonoscopy       Diagnosis: Colon cancer screening [Z12.11]  Diagnosis Additional Information: No value filed.    Anesthesia Type:   MAC     Note:    Oropharynx: oropharynx clear of all foreign objects and spontaneously breathing  Level of Consciousness: drowsy  Oxygen Supplementation: room air    Independent Airway: airway patency satisfactory and stable  Dentition: dentition unchanged  Vital Signs Stable: post-procedure vital signs reviewed and stable  Report to RN Given: handoff report given  Patient transferred to: Phase II    Handoff Report: Identifed the Patient, Identified the Reponsible Provider, Reviewed the pertinent medical history, Discussed the surgical course, Reviewed Intra-OP anesthesia mangement and issues during anesthesia, Set expectations for post-procedure period and Allowed opportunity for questions and acknowledgement of understanding      Vitals:  Vitals Value Taken Time   BP 87/52 03/25/25 0922   Temp     Pulse 79 03/25/25 0922   Resp     SpO2 97 % 03/25/25 0923   Vitals shown include unfiled device data.    Electronically Signed By: CHE Yuen CRNA  March 25, 2025  9:24 AM

## 2025-03-25 NOTE — ANESTHESIA PREPROCEDURE EVALUATION
Anesthesia Pre-Procedure Evaluation    Patient: Danii Henderson   MRN: [id number] : 1965        Procedure : Procedure(s):  Colonoscopy          Past Medical History:   Diagnosis Date    Hypertension       Past Surgical History:   Procedure Laterality Date    SPINE SURGERY  2012    disectomy and fusion      Allergies   Allergen Reactions    Paroxetine Shortness Of Breath     diaphoresis    Cyproheptadine Swelling    Methadone Hives and Rash      Social History     Tobacco Use    Smoking status: Former     Types: Cigarettes     Passive exposure: Current    Smokeless tobacco: Not on file   Substance Use Topics    Alcohol use: No      Wt Readings from Last 1 Encounters:   25 68 kg (150 lb)        Anesthesia Evaluation   Pt has had prior anesthetic.     No history of anesthetic complications       ROS/MED HX  ENT/Pulmonary:     (+)                tobacco use, Current use,                       Neurologic: Comment: Chronic cervical neck pain      Cardiovascular:     (+)  hypertension- -   -  - -                                      METS/Exercise Tolerance: >4 METS    Hematologic:  - neg hematologic  ROS     Musculoskeletal: Comment: Lower back pain with radiculopathy       GI/Hepatic:     (+)        bowel prep,            Renal/Genitourinary:  - neg Renal ROS     Endo:  - neg endo ROS     Psychiatric/Substance Use:     (+)    H/O chronic opiod use .     Infectious Disease:  - neg infectious disease ROS     Malignancy:  - neg malignancy ROS     Other:  - neg other ROS    (+)  , H/O Chronic Pain,         Physical Exam    Airway        Mallampati: I   TM distance: > 3 FB   Neck ROM: full   Mouth opening: > 3 cm    Respiratory Devices and Support         Dental       (+) Edentulous      Cardiovascular   cardiovascular exam normal       Rhythm and rate: regular and normal     Pulmonary   pulmonary exam normal        breath sounds clear to auscultation           OUTSIDE LABS:  CBC:   Lab Results  "  Component Value Date    WBC 8.1 08/20/2009    WBC 10.2 08/06/2009    HGB 11.5 (L) 08/20/2009    HGB 10.3 (L) 08/06/2009    HCT 34.9 (L) 08/20/2009    HCT 32.6 (L) 08/06/2009     08/20/2009     08/06/2009     BMP:   Lab Results   Component Value Date     01/12/2010     08/20/2009    POTASSIUM 3.8 01/12/2010    POTASSIUM 3.7 08/20/2009    CHLORIDE 111 (H) 01/12/2010    CHLORIDE 109 08/20/2009    CO2 27 01/12/2010    CO2 23 08/20/2009    BUN 7 01/12/2010    BUN 6 08/20/2009    CR 0.63 01/12/2010    CR 0.55 08/20/2009     (H) 03/25/2025    GLC 98 01/12/2010     COAGS:   Lab Results   Component Value Date    INR 0.99 08/06/2009     POC: No results found for: \"BGM\", \"HCG\", \"HCGS\"  HEPATIC:   Lab Results   Component Value Date    ALBUMIN 4.4 01/12/2010    PROTTOTAL 7.6 01/12/2010    ALT 24 01/12/2010    AST 21 01/12/2010    ALKPHOS 99 01/12/2010    BILITOTAL 0.3 01/12/2010     OTHER:   Lab Results   Component Value Date    GIO 9.5 01/12/2010    PHOS 3.9 08/06/2009    MAG 2.1 08/06/2009    LIPASE 60 01/12/2010       Anesthesia Plan    ASA Status:  2    NPO Status:  NPO Appropriate    Anesthesia Type: MAC.              Consents    Anesthesia Plan(s) and associated risks, benefits, and realistic alternatives discussed. Questions answered and patient/representative(s) expressed understanding.     - Discussed: Risks, Benefits and Alternatives for BOTH SEDATION and the PROCEDURE were discussed     - Discussed with:  Patient      - Extended Intubation/Ventilatory Support Discussed: No.      - Patient is DNR/DNI Status: No     Use of blood products discussed: No .     Postoperative Care            Comments:               CHE Yuen CRNA    Clinically Significant Risk Factors Present on Admission                 # Drug Induced Platelet Defect: home medication list includes an antiplatelet medication   # Hypertension: Home medication list includes antihypertensive(s)           # " "Overweight: Estimated body mass index is 26.57 kg/m  as calculated from the following:    Height as of this encounter: 1.6 m (5' 3\").    Weight as of this encounter: 68 kg (150 lb).                "

## 2025-03-25 NOTE — DISCHARGE INSTRUCTIONS
[facility]  Adult Discharge Orders & Instructions    ________________________________________________________________          For 12 hours after surgery  Get plenty of rest.  A responsible adult must stay with you for at least 12 hours after you leave the hospital.   You may feel lightheaded.  IF so, sit for a few minutes before standing.  Have someone help you get up.   You may have a slight fever. Call the doctor if your fever is over 101 F (38.3 C) (taken under the tongue) or lasts longer than 24 hours.  You may have a dry mouth, a sore throat, muscle aches or trouble sleeping.  These should go away after 24 hours.  Do not make important or legal decisions.  6.   Do not drive or use heavy equipment.  If you have weakness or tingling, don't drive or use heavy equipment until this feeling goes away.    To contact a doctor, call   [phone]_______________________

## 2025-03-25 NOTE — H&P
GENERAL SURGERY CONSULTATION NOTE    Danii Henderson   [address] [facility] [address]  59 year old  female    Primary Care Provider:  Barbara Trevizo      HPI: Danii Henderson presents to day surgery in need of colonoscopy for screening for colon cancer.   Danii Henderson denies family history of colon cancer. Patient denies change in bowel habits or blood in stools. Previous colonoscopy was never.     REVIEW OF SYSTEMS:    GENERAL: No fevers or chills. Denies fatigue, recent weight loss.  HEENT: No sinus drainage. No changes with vision or hearing. No difficulty swallowing.   LYMPHATICS:  Noswollen nodes in axilla, neck or groin.  CARDIOVASCULAR: Denies chest pain, palpitations and dyspnea on exertion.  PULMONARY: No shortness of breath or cough. No increase in sputum production.  GI: Denies melena,bright red blood in stools. No hematemesis. No constipation or diarrhea.  : No dysuria or hematuria.  SKIN: No recent rashes or ulcers.   HEMATOLOGY:  No history of easy bruising or bleeding.  ENDOCRINE:  No history of diabetes or thyroid problems.  NEUROLOGY:  No history of seizures or headaches. No motor or sensory changes.        Patient Active Problem List   Diagnosis    Herniated lumbar intervertebral disc    Lumbosacral radiculitis    Sacroiliac dysfunction    Hip pain, left    Hip pain, right    Chronic bilateral low back pain with bilateral sciatica       Past Medical History:   Diagnosis Date    Hypertension        Past Surgical History:   Procedure Laterality Date    SPINE SURGERY  06/04/2012    disectomy and fusion       History reviewed. No pertinent family history.    Social History     Social History Narrative    Not on file       Social History     Socioeconomic History    Marital status: Single     Spouse name: Not on file    Number of children: Not on file    Years of education: Not on file    Highest education level: Not on file   Occupational History    Not on file   Tobacco Use     Smoking status: Former     Types: Cigarettes     Passive exposure: Current    Smokeless tobacco: Not on file   Vaping Use    Vaping status: Never Used   Substance and Sexual Activity    Alcohol use: No    Drug use: No    Sexual activity: Not on file   Other Topics Concern    Not on file   Social History Narrative    Not on file     Social Drivers of Health     Financial Resource Strain: Not on file   Food Insecurity: No Food Insecurity (5/9/2022)    [facility]    Hunger Vital Sign     Worried About Running Out of Food in the Last Year: Never true     Ran Out of Food in the Last Year: Never true   Transportation Needs: Not on file   Physical Activity: Not on file   Stress: Not on file   Social Connections: Not on file   Interpersonal Safety: Low Risk  (3/25/2025)    Interpersonal Safety     Do you feel physically and emotionally safe where you currently live?: Yes     Within the past 12 months, have you been hit, slapped, kicked or otherwise physically hurt by someone?: No     Within the past 12 months, have you been humiliated or emotionally abused in other ways by your partner or ex-partner?: No   Housing Stability: Not on file       Current Facility-Administered Medications   Medication Dose Route Frequency Provider Last Rate Last Admin    lactated ringers infusion   Intravenous Continuous Elfego Liriano MD 30 mL/hr at 03/25/25 0824 30 mL/hr at 03/25/25 0824    lidocaine (LMX4) cream   Topical Q1H PRN Elfego Liriano MD        lidocaine 1 % 0.1-1 mL  0.1-1 mL Other Q1H PRN Elfego Liriano MD        sodium chloride (PF) 0.9% PF flush 3 mL  3 mL Intracatheter Q8H Elfego Liriano MD        sodium chloride (PF) 0.9% PF flush 3 mL  3 mL Intracatheter q1 min prn Elfego Liriano MD             ALLERGIES/SENSITIVITIES:   Allergies   Allergen Reactions    Paroxetine Shortness Of Breath     diaphoresis    Cyproheptadine Swelling     "Methadone Hives and Rash       PHYSICAL EXAM:     /65   Pulse 88   Temp 97.2  F (36.2  C) (Tympanic)   Ht 1.6 m (5' 3\")   Wt 68 kg (150 lb)   SpO2 98%   BMI 26.57 kg/m      General Appearance:   Sitting up in bed, no apparent distress  HEENT: Pupils are equal and reactive, no scleral icterus   Heart & CV:  RRR, no murmur.  LUNGS: No increased work of breathing. Lungs are CTA B/L, no wheezing or crackles.  Abd:  soft, non-tender, no masses   Ext: no lower extremity edema   Neuro: alert and oriented, normal speech and mentation         CONSULTATION ASSESSMENT AND PLAN:    59 year old female with averaeg risk for colon cancer.      The technical details of colonoscopy were discussed with the patient along with the risks and benefits to include bleeding, perforation and incomplete study. Danii Henderson demonstrated understanding and is willing to proceed.       Elfego Liriano MD on 3/25/2025 at 8:32 AM      "

## 2025-03-25 NOTE — ANESTHESIA POSTPROCEDURE EVALUATION
Patient: Danii Henderson    Procedure: Procedure(s):  Colonoscopy       Anesthesia Type:  MAC    Note:  Disposition: Outpatient   Postop Pain Control: Uneventful            Sign Out: Well controlled pain   PONV: No   Neuro/Psych: Uneventful            Sign Out: Acceptable/Baseline neuro status   Airway/Respiratory: Uneventful            Sign Out: Acceptable/Baseline resp. status   CV/Hemodynamics: Uneventful            Sign Out: Acceptable CV status; No obvious hypovolemia; No obvious fluid overload   Other NRE: NONE   DID A NON-ROUTINE EVENT OCCUR?            Last vitals:  Vitals Value Taken Time   BP 91/57 03/25/25 0930   Temp     Pulse 79 03/25/25 0930   Resp     SpO2 97 % 03/25/25 0943   Vitals shown include unfiled device data.    Electronically Signed By: CHE Yuen CRNA  March 25, 2025  9:44 AM

## 2025-03-25 NOTE — OP NOTE
COLONOSCOPY PROCEDURE NOTE    DATE OF SERVICE: 3/25/2025    SURGEON: MICHELLE Liriano MD     PRE-OP DIAGNOSIS:    Screening for colon cancer     POST-OP DIAGNOSIS:    Normal Exam    PROCEDURE:   Colonoscopy    ASSISTANT:  Circulator: Carley Recinos RN  Scrub Person: Tina Shcmitz    ANESTHESIA:  MAC                            MACCRNA Independent: Altaf Elder APRN CRNA    INDICATION FOR THE PROCEDURE: The patient is a 59 year old female with history of benign colon polyps. The patient has no other complaints.  After explaining the risks to include bleeding, perforation, potential inability to reach the cecum the patient wishes to proceed.    PROCEDURE: After adequate sedation, the patient was in the left lateral decubitus position.  Rectal exam was performed.  There was normal tone and no palpable masses.  The colonoscope was introduced into the rectum and advanced to the cecum with Mild difficulty.  The patient's prep was good.  The terminal cecum was reached.  The cecum, ascending, transverse, descending and sigmoid colon were grossly unremarkable .  The scope was retroflexed in the rectum.  The anorectal junction was unremarkable.  The scope was straightened and removed.  The patient tolerated the procedure well.     ESTIMATED BLOOD LOSS: none    COMPLICATIONS:  None    TISSUE REMOVED:  No    RECOMMEND:    Follow-up in 10 years        MICHELLE Liriano MD

## 2025-05-15 NOTE — PROGRESS NOTES
Patient Information     Patient Name MRN Danii Adams [id number] Female 1965      Progress Notes by Ruth Cano, PT at 3/7/2017  9:47 AM     Author:  Ruth Cano, PT Service:  (none) Author Type:  PT- Physical Therapist     Filed:  3/7/2017  9:48 AM Date of Service:  3/7/2017  9:47 AM Status:  Signed     :  Ruth Cano PT (PT- Physical Therapist)            [facility]  Outpatient PT - Cancellation Note    Date:  3-7-17      Patient cancelled 3-7-17 and 3-9-17 visit due to death in the family.      Next visit scheduled:  3-14-17      Ruth Cano, PT ....................  3/7/2017   9:48 AM             [Care Plan reviewed and provided to patient/caregiver] : Care plan reviewed and provided to patient/caregiver [Understands and communicates without difficulty] : Patient/Caregiver understands and communicates without difficulty

## (undated) DEVICE — SUCTION MANIFOLD NEPTUNE 2 SYS 4 PORT 0702-020-000

## (undated) DEVICE — TUBING SUCTION 10'X3/16" N510

## (undated) DEVICE — ENDO BRUSH CHANNEL MASTER CLEANING 2-4.2MM BW-412T

## (undated) DEVICE — ENDO KIT COMPLIANCE DYKENDOCMPLY

## (undated) DEVICE — SOL WATER 1500ML

## (undated) RX ORDER — FENTANYL CITRATE-0.9 % NACL/PF 10 MCG/ML
PLASTIC BAG, INJECTION (ML) INTRAVENOUS
Status: DISPENSED
Start: 2025-03-25

## (undated) RX ORDER — HYDROMORPHONE HYDROCHLORIDE 1 MG/ML
INJECTION, SOLUTION INTRAMUSCULAR; INTRAVENOUS; SUBCUTANEOUS
Status: DISPENSED
Start: 2025-03-25

## (undated) RX ORDER — LIDOCAINE HYDROCHLORIDE 10 MG/ML
INJECTION, SOLUTION INFILTRATION; PERINEURAL
Status: DISPENSED
Start: 2024-03-14

## (undated) RX ORDER — REGADENOSON 0.08 MG/ML
INJECTION, SOLUTION INTRAVENOUS
Status: DISPENSED
Start: 2024-07-09

## (undated) RX ORDER — PROPOFOL 10 MG/ML
INJECTION, EMULSION INTRAVENOUS
Status: DISPENSED
Start: 2025-03-25